# Patient Record
Sex: FEMALE | Race: WHITE | URBAN - METROPOLITAN AREA
[De-identification: names, ages, dates, MRNs, and addresses within clinical notes are randomized per-mention and may not be internally consistent; named-entity substitution may affect disease eponyms.]

---

## 2018-11-26 RX ORDER — ROSUVASTATIN CALCIUM 10 MG/1
10 TABLET, COATED ORAL
COMMUNITY
End: 2018-11-26 | Stop reason: SDUPTHER

## 2018-11-26 RX ORDER — LORAZEPAM 0.5 MG/1
0.5 TABLET ORAL DAILY
COMMUNITY
End: 2018-12-14 | Stop reason: SDUPTHER

## 2018-11-26 RX ORDER — LORAZEPAM 0.5 MG/1
0.5 TABLET ORAL DAILY
Qty: 30 TAB | OUTPATIENT
Start: 2018-11-26

## 2018-11-26 RX ORDER — ROSUVASTATIN CALCIUM 10 MG/1
10 TABLET, COATED ORAL
Qty: 90 TAB | Refills: 0 | Status: SHIPPED | OUTPATIENT
Start: 2018-11-26 | End: 2019-02-20 | Stop reason: SDUPTHER

## 2018-11-27 NOTE — TELEPHONE ENCOUNTER
Patient's medications were refilled with the exception of lorazepam. Patient must be seen for refilled on controlled substances per Dr. Elier Marsh

## 2018-11-28 NOTE — TELEPHONE ENCOUNTER
Called  patient and spoke with her . Confirmed that patients Rosuvastatin had been approved and sent to the pharmacy but patient would need an appt to refill controlled substance, Lorazepam.  Her  states she was having her hair done just now and she would call back to schedule this.

## 2018-12-06 RX ORDER — LORAZEPAM 0.5 MG/1
TABLET ORAL
Qty: 30 TAB | Refills: 0 | OUTPATIENT
Start: 2018-12-06

## 2018-12-07 NOTE — TELEPHONE ENCOUNTER
Patient must be seen for refill of controlled substances.  Medication was refused by Dr. Catherine Bender

## 2018-12-14 ENCOUNTER — OFFICE VISIT (OUTPATIENT)
Dept: FAMILY MEDICINE CLINIC | Age: 78
End: 2018-12-14

## 2018-12-14 VITALS
HEIGHT: 65 IN | WEIGHT: 126 LBS | RESPIRATION RATE: 16 BRPM | OXYGEN SATURATION: 97 % | TEMPERATURE: 98.1 F | HEART RATE: 59 BPM | SYSTOLIC BLOOD PRESSURE: 104 MMHG | BODY MASS INDEX: 20.99 KG/M2 | DIASTOLIC BLOOD PRESSURE: 65 MMHG

## 2018-12-14 DIAGNOSIS — E78.5 HYPERLIPIDEMIA, UNSPECIFIED HYPERLIPIDEMIA TYPE: ICD-10-CM

## 2018-12-14 DIAGNOSIS — F41.9 ANXIETY: ICD-10-CM

## 2018-12-14 DIAGNOSIS — Z00.00 ANNUAL PHYSICAL EXAM: Primary | ICD-10-CM

## 2018-12-14 DIAGNOSIS — R00.2 HEART PALPITATIONS: ICD-10-CM

## 2018-12-14 DIAGNOSIS — Z00.00 MEDICARE ANNUAL WELLNESS VISIT, SUBSEQUENT: ICD-10-CM

## 2018-12-14 RX ORDER — FLECAINIDE ACETATE 50 MG/1
TABLET ORAL
Refills: 3 | COMMUNITY
Start: 2018-11-07

## 2018-12-14 RX ORDER — METOPROLOL SUCCINATE 25 MG/1
TABLET, EXTENDED RELEASE ORAL
Refills: 1 | COMMUNITY
Start: 2018-09-13 | End: 2018-12-14 | Stop reason: SDUPTHER

## 2018-12-14 RX ORDER — LORAZEPAM 0.5 MG/1
0.5 TABLET ORAL
Qty: 30 TAB | Refills: 1 | Status: SHIPPED | OUTPATIENT
Start: 2018-12-14 | End: 2020-03-04 | Stop reason: SDUPTHER

## 2018-12-14 RX ORDER — METOPROLOL SUCCINATE 25 MG/1
TABLET, EXTENDED RELEASE ORAL
Qty: 90 TAB | Refills: 1 | Status: SHIPPED | OUTPATIENT
Start: 2018-12-14 | End: 2019-06-11 | Stop reason: SDUPTHER

## 2018-12-14 NOTE — PROGRESS NOTES
Assessment/Plan:     Diagnoses and all orders for this visit:    1. Annual physical exam  -     METABOLIC PANEL, BASIC  -     MICROALBUMIN, UR, RAND W/ MICROALB/CREAT RATIO  -     CBC WITH AUTOMATED DIFF  -     URINALYSIS W/ RFLX MICROSCOPIC  -     HEPATIC FUNCTION PANEL    2. Hyperlipidemia, unspecified hyperlipidemia type  -     LIPID PANEL  - Presumed stable, labs today    3. Heart palpitations  -     metoprolol succinate (TOPROL-XL) 25 mg XL tablet; TK 1 T PO every day  - Stable today, Managed by specialist    4. Anxiety  -     LORazepam (ATIVAN) 0.5 mg tablet; Take 1 Tab by mouth daily as needed for Anxiety. - Controlled, refill 30 day of Lorazepam,  checked and appropriated. Explained to patient will not refill again and she will need to find a physician to manage this medication. Understanding verbalized. 5. Medicare annual wellness visit, subsequent        Follow-up Disposition:  Return in about 6 months (around 6/14/2019) for Follow Up. Discussed expected course/resolution/complications of diagnosis in detail with patient.    Medication risks/benefits/costs/interactions/alternatives discussed with patient.    Pt was given after visit summary which includes diagnoses, current medications & vitals. Pt expressed understanding with the diagnosis and plan        Subjective:      Isidro Sharma is a 70 y.o. female who presents for had concerns including Complete Physical and Medication Refill (lorazepam / Metoprolol ER Succ). Here today for annual physical.  Has a history of low blood pressure, palpitations, and hyperlipidemia. She takes Lorazepam for anxiety when flying. Last refill was 12/2017, 1 year ago. Does not exercise much. States she eats a healthy diet. Sees a dentist every 6 months. Sees an eye doctor annually. Has had colonoscopies in the past and due back after 80. Has yearly mammograms.     Anxiety  States has had anxiety for many year and was started on Lorazepam \"years ago\"  She only takes when flying or needed and a 3 month prescription lasts her over a year. She denies depression or any suicidal thoughts. Current Outpatient Medications   Medication Sig Dispense Refill    flecainide (TAMBOCOR) 50 mg tablet TK 1 T PO Q 12 H  3    metoprolol succinate (TOPROL-XL) 25 mg XL tablet TK 1 T PO QD 90 Tab 1    LORazepam (ATIVAN) 0.5 mg tablet Take 1 Tab by mouth daily as needed for Anxiety. 30 Tab 1    rosuvastatin (CRESTOR) 10 mg tablet Take 1 Tab by mouth nightly. 90 Tab 0       No Known Allergies    ROS:   Review of Systems   Constitutional: Negative for chills, fever and malaise/fatigue. HENT: Negative for congestion and sore throat. Respiratory: Negative for cough and shortness of breath. Cardiovascular: Positive for palpitations. Negative for chest pain and leg swelling. Gastrointestinal: Negative for abdominal pain, constipation, diarrhea, nausea and vomiting. Genitourinary: Negative for dysuria, frequency, hematuria and urgency. Neurological: Negative for dizziness and headaches. Psychiatric/Behavioral: Negative for depression. The patient is nervous/anxious. The patient does not have insomnia. Objective:     Visit Vitals  /65 (BP 1 Location: Right arm, BP Patient Position: Sitting)   Pulse (!) 59   Temp 98.1 °F (36.7 °C) (Oral)   Resp 16   Ht 5' 5\" (1.651 m)   Wt 126 lb (57.2 kg)   SpO2 97%   BMI 20.97 kg/m²       Vitals and Nurse Documentation reviewed. Physical Exam   Constitutional: She is well-developed, well-nourished, and in no distress. HENT:   Head: Normocephalic and atraumatic. Right Ear: Tympanic membrane normal.   Left Ear: Tympanic membrane normal.   Nose: Nose normal.   Mouth/Throat: Oropharynx is clear and moist. Normal dentition. Eyes: EOM are normal. Pupils are equal, round, and reactive to light. Right eye exhibits no discharge. Left eye exhibits no discharge. Right conjunctiva is not injected.  Left conjunctiva is not injected. Neck: Neck supple. Carotid bruit is not present. No thyroid mass and no thyromegaly present. Cardiovascular: Normal rate, regular rhythm, S1 normal and S2 normal. Exam reveals no gallop and no friction rub. No murmur heard. Pulses:       Dorsalis pedis pulses are 2+ on the right side. Posterior tibial pulses are 2+ on the right side. Pulmonary/Chest: Breath sounds normal.   Abdominal: Normal appearance and bowel sounds are normal. She exhibits no distension and no mass. There is no hepatosplenomegaly. There is no tenderness. Musculoskeletal: Normal range of motion. Lymphadenopathy:     She has no cervical adenopathy. Neurological: She is alert. She has normal sensation and normal strength. Gait normal. Gait normal.   Skin: Skin is warm, dry and intact. No rash noted. Psychiatric: Mood and affect normal.       No results found for this or any previous visit. This is the Subsequent Medicare Annual Wellness Exam, performed 12 months or more after the Initial AWV or the last Subsequent AWV    I have reviewed the patient's medical history in detail and updated the computerized patient record.      History     Past Medical History:   Diagnosis Date    Colonic polyp     Gastroesophageal reflux disease     Hemorrhagic cystitis     Hyperlipemia     Impaired fasting glucose     5/9/2012 106     Irritable bowel syndrome (IBS)     Osteoporosis     was on Bisphosphonate over 5 years    Pigmented skin lesion     Face ( Benign)     Sudeck's atrophy     PHof  Sudec's Atrophy     Urinary tract infection     Recurrent Frequent      Past Surgical History:   Procedure Laterality Date    HX APPENDECTOMY      HX OTHER SURGICAL      Fracture  right  [place]      Current Outpatient Medications   Medication Sig Dispense Refill    flecainide (TAMBOCOR) 50 mg tablet TK 1 T PO Q 12 H  3    metoprolol succinate (TOPROL-XL) 25 mg XL tablet TK 1 T PO QD 90 Tab 1    LORazepam (ATIVAN) 0.5 mg tablet Take 1 Tab by mouth daily as needed for Anxiety. 30 Tab 1    rosuvastatin (CRESTOR) 10 mg tablet Take 1 Tab by mouth nightly. 80 Tab 0     No Known Allergies  Family History   Family history unknown: Yes     Social History     Tobacco Use    Smoking status: Never Smoker    Smokeless tobacco: Never Used   Substance Use Topics    Alcohol use: No     Frequency: Never     There is no problem list on file for this patient. Depression Risk Factor Screening:     PHQ over the last two weeks 12/14/2018   Little interest or pleasure in doing things Not at all   Feeling down, depressed, irritable, or hopeless Not at all   Total Score PHQ 2 0     Alcohol Risk Factor Screening: You do not drink alcohol or very rarely. Functional Ability and Level of Safety:   Hearing Loss  Hearing is good. Activities of Daily Living  The home contains: no safety equipment. Patient does total self care    Fall Risk  Fall Risk Assessment, last 12 mths 12/14/2018   Able to walk? Yes   Fall in past 12 months? No       Abuse Screen  Patient is not abused    Cognitive Screening   Evaluation of Cognitive Function:  Has your family/caregiver stated any concerns about your memory: no  Normal    Patient Care Team   Patient Care Team:  Se Fernandez MD as PCP - General (Family Practice)    Assessment/Plan   Education and counseling provided:  Are appropriate based on today's review and evaluation  Pneumococcal Vaccine  Influenza Vaccine  Screening Mammography    Diagnoses and all orders for this visit:    1. Annual physical exam  -     METABOLIC PANEL, BASIC  -     MICROALBUMIN, UR, RAND W/ MICROALB/CREAT RATIO  -     CBC WITH AUTOMATED DIFF  -     URINALYSIS W/ RFLX MICROSCOPIC  -     HEPATIC FUNCTION PANEL    2. Hyperlipidemia, unspecified hyperlipidemia type  -     LIPID PANEL    3. Heart palpitations  -     metoprolol succinate (TOPROL-XL) 25 mg XL tablet; TK 1 T PO QD    4.  Anxiety  -     LORazepam (ATIVAN) 0.5 mg tablet; Take 1 Tab by mouth daily as needed for Anxiety.     5. Medicare annual wellness visit, subsequent        Health Maintenance Due   Topic Date Due    DTaP/Tdap/Td series (1 - Tdap) 10/13/1963    GLAUCOMA SCREENING Q2Y  10/13/2007    Bone Densitometry (Dexa) Screening  10/13/2007    Pneumococcal 65+ Low/Medium Risk (1 of 2 - PCV13) 10/13/2007

## 2018-12-14 NOTE — PROGRESS NOTES
Severiano Sager is a 70 y.o. female      Chief Complaint   Patient presents with    Complete Physical    Medication Refill     lorazepam / Metoprolol ER Succ         1. Have you been to the ER, urgent care clinic since your last visit? Hospitalized since your last visit? No      2. Have you seen or consulted any other health care providers outside of the [address] [place] [address] since your last visit? Include any pap smears or colon screening.   No

## 2018-12-14 NOTE — PATIENT INSTRUCTIONS
Medicare Wellness Visit, Female     The best way to live healthy is to have a lifestyle where you eat a well-balanced diet, exercise regularly, limit alcohol use, and quit all forms of tobacco/nicotine, if applicable. Regular preventive services are another way to keep healthy. Preventive services (vaccines, screening tests, monitoring & exams) can help personalize your care plan, which helps you manage your own care. Screening tests can find health problems at the earliest stages, when they are easiest to treat. [facility] follows the current, evidence-based guidelines published by the [facility] ([facility]) when recommending preventive services for our patients. Because we follow these guidelines, sometimes recommendations change over time as research supports it. (For example, mammograms used to be recommended annually. Even though Medicare will still pay for an annual mammogram, the newer guidelines recommend a mammogram every two years for women of average risk.)  Of course, you and your doctor may decide to screen more often for some diseases, based on your risk and your health status. Preventive services for you include:  - Medicare offers their members a free annual wellness visit, which is time for you and your primary care provider to discuss and plan for your preventive service needs. Take advantage of this benefit every year!  -All adults over the age of 72 should receive the recommended pneumonia vaccines. Current USPSTF guidelines recommend a series of two vaccines for the best pneumonia protection.   -All adults should have a flu vaccine yearly and a tetanus vaccine every 10 years. All adults age 61 and older should receive a shingles vaccine once in their lifetime.    -A bone mass density test is recommended when a woman turns 65 to screen for osteoporosis. This test is only recommended one time, as a screening.  Some providers will use this same test as a disease monitoring tool if you already have osteoporosis. -All adults age 38-68 who are overweight should have a diabetes screening test once every three years.   -Other screening tests and preventive services for persons with diabetes include: an eye exam to screen for diabetic retinopathy, a kidney function test, a foot exam, and stricter control over your cholesterol.   -Cardiovascular screening for adults with routine risk involves an electrocardiogram (ECG) at intervals determined by your doctor.   -Colorectal cancer screenings should be done for adults age 54-65 with no increased risk factors for colorectal cancer. There are a number of acceptable methods of screening for this type of cancer. Each test has its own benefits and drawbacks. Discuss with your doctor what is most appropriate for you during your annual wellness visit. The different tests include: colonoscopy (considered the best screening method), a fecal occult blood test, a fecal DNA test, and sigmoidoscopy. -Breast cancer screenings are recommended every other year for women of normal risk, age 54-69.  -Cervical cancer screenings for women over age 72 are only recommended with certain risk factors.   -All adults born between [facility] should be screened once for Hepatitis C. Here is a list of your current Health Maintenance items (your personalized list of preventive services) with a due date:  Health Maintenance Due   Topic Date Due    DTaP/Tdap/Td  (1 - Tdap) 10/13/1963    Glaucoma Screening   10/13/2007    Bone Mineral Density   10/13/2007    Pneumococcal Vaccine (1 of 2 - PCV13) 10/13/2007            High Cholesterol: Care Instructions  Your Care Instructions    Cholesterol is a type of fat in your blood. It is needed for many body functions, such as making new cells. Cholesterol is made by your body. It also comes from food you eat. High cholesterol means that you have too much of the fat in your blood.  This raises your risk of a heart attack and stroke. LDL and HDL are part of your total cholesterol. LDL is the \"bad\" cholesterol. High LDL can raise your risk for heart disease, heart attack, and stroke. HDL is the \"good\" cholesterol. It helps clear bad cholesterol from the body. High HDL is linked with a lower risk of heart disease, heart attack, and stroke. Your cholesterol levels help your doctor find out your risk for having a heart attack or stroke. You and your doctor can talk about whether you need to lower your risk and what treatment is best for you. A heart-healthy lifestyle along with medicines can help lower your cholesterol and your risk. The way you choose to lower your risk will depend on how high your risk is for heart attack and stroke. It will also depend on how you feel about taking medicines. Follow-up care is a key part of your treatment and safety. Be sure to make and go to all appointments, and call your doctor if you are having problems. It's also a good idea to know your test results and keep a list of the medicines you take. How can you care for yourself at home? · Eat a variety of foods every day. Good choices include fruits, vegetables, whole grains (like oatmeal), dried beans and peas, nuts and seeds, soy products (like tofu), and fat-free or low-fat dairy products. · Replace butter, margarine, and hydrogenated or partially hydrogenated oils with olive and canola oils. (Canola oil margarine without trans fat is fine.)  · Replace red meat with fish, poultry, and soy protein (like tofu). · Limit processed and packaged foods like chips, crackers, and cookies. · Bake, broil, or steam foods. Don't siddiqui them. · Be physically active. Get at least 30 minutes of exercise on most days of the week. Walking is a good choice. You also may want to do other activities, such as running, swimming, cycling, or playing tennis or team sports.   · Stay at a healthy weight or lose weight by making the changes in eating and physical activity listed above. Losing just a small amount of weight, even 5 to 10 pounds, can reduce your risk for having a heart attack or stroke. · Do not smoke. When should you call for help? Watch closely for changes in your health, and be sure to contact your doctor if:    · You need help making lifestyle changes.     · You have questions about your medicine. Where can you learn more? Go to http://seferino-cornell.info/. Enter N428 in the search box to learn more about \"High Cholesterol: Care Instructions. \"  Current as of: December 6, 2017  Content Version: 11.8  © 9239-4135 [employer]. Care instructions adapted under license by [employer] (which disclaims liability or warranty for this information). If you have questions about a medical condition or this instruction, always ask your healthcare professional. [address] any warranty or liability for your use of this information.

## 2018-12-15 LAB
ALBUMIN SERPL-MCNC: 4.5 G/DL (ref 3.5–4.8)
ALBUMIN/CREAT UR: 34 MG/G CREAT (ref 0–30)
ALP SERPL-CCNC: 58 IU/L (ref 39–117)
ALT SERPL-CCNC: 29 IU/L (ref 0–32)
APPEARANCE UR: CLEAR
AST SERPL-CCNC: 34 IU/L (ref 0–40)
BASOPHILS # BLD AUTO: 0 X10E3/UL (ref 0–0.2)
BASOPHILS NFR BLD AUTO: 0 %
BILIRUB DIRECT SERPL-MCNC: 0.2 MG/DL (ref 0–0.4)
BILIRUB SERPL-MCNC: 0.7 MG/DL (ref 0–1.2)
BILIRUB UR QL STRIP: NEGATIVE
BUN SERPL-MCNC: 16 MG/DL (ref 8–27)
BUN/CREAT SERPL: 24 (ref 12–28)
CALCIUM SERPL-MCNC: 9.4 MG/DL (ref 8.7–10.3)
CHLORIDE SERPL-SCNC: 103 MMOL/L (ref 96–106)
CHOLEST SERPL-MCNC: 167 MG/DL (ref 100–199)
CO2 SERPL-SCNC: 28 MMOL/L (ref 20–29)
COLOR UR: YELLOW
CREAT SERPL-MCNC: 0.66 MG/DL (ref 0.57–1)
CREAT UR-MCNC: 67.9 MG/DL
EOSINOPHIL # BLD AUTO: 0 X10E3/UL (ref 0–0.4)
EOSINOPHIL NFR BLD AUTO: 0 %
ERYTHROCYTE [DISTWIDTH] IN BLOOD BY AUTOMATED COUNT: 12.8 % (ref 12.3–15.4)
GLUCOSE SERPL-MCNC: 98 MG/DL (ref 65–99)
GLUCOSE UR QL: NEGATIVE
HCT VFR BLD AUTO: 44 % (ref 34–46.6)
HDLC SERPL-MCNC: 76 MG/DL
HGB BLD-MCNC: 14.1 G/DL (ref 11.1–15.9)
HGB UR QL STRIP: NEGATIVE
IMM GRANULOCYTES # BLD: 0 X10E3/UL (ref 0–0.1)
IMM GRANULOCYTES NFR BLD: 0 %
KETONES UR QL STRIP: NEGATIVE
LDLC SERPL CALC-MCNC: 76 MG/DL (ref 0–99)
LEUKOCYTE ESTERASE UR QL STRIP: NEGATIVE
LYMPHOCYTES # BLD AUTO: 1.9 X10E3/UL (ref 0.7–3.1)
LYMPHOCYTES NFR BLD AUTO: 26 %
MCH RBC QN AUTO: 30.7 PG (ref 26.6–33)
MCHC RBC AUTO-ENTMCNC: 32 G/DL (ref 31.5–35.7)
MCV RBC AUTO: 96 FL (ref 79–97)
MICRO URNS: NORMAL
MICROALBUMIN UR-MCNC: 23.1 UG/ML
MONOCYTES # BLD AUTO: 0.4 X10E3/UL (ref 0.1–0.9)
MONOCYTES NFR BLD AUTO: 6 %
NEUTROPHILS # BLD AUTO: 4.8 X10E3/UL (ref 1.4–7)
NEUTROPHILS NFR BLD AUTO: 68 %
NITRITE UR QL STRIP: NEGATIVE
PH UR STRIP: 5.5 [PH] (ref 5–7.5)
PLATELET # BLD AUTO: 176 X10E3/UL (ref 150–379)
POTASSIUM SERPL-SCNC: 3.9 MMOL/L (ref 3.5–5.2)
PROT SERPL-MCNC: 6.7 G/DL (ref 6–8.5)
PROT UR QL STRIP: NEGATIVE
RBC # BLD AUTO: 4.59 X10E6/UL (ref 3.77–5.28)
SODIUM SERPL-SCNC: 143 MMOL/L (ref 134–144)
SP GR UR: 1.01 (ref 1–1.03)
TRIGL SERPL-MCNC: 74 MG/DL (ref 0–149)
UROBILINOGEN UR STRIP-MCNC: 0.2 MG/DL (ref 0.2–1)
VLDLC SERPL CALC-MCNC: 15 MG/DL (ref 5–40)
WBC # BLD AUTO: 7.1 X10E3/UL (ref 3.4–10.8)

## 2019-06-05 ENCOUNTER — OFFICE VISIT (OUTPATIENT)
Dept: FAMILY MEDICINE CLINIC | Age: 79
End: 2019-06-05

## 2019-06-05 VITALS
TEMPERATURE: 98.3 F | BODY MASS INDEX: 20.99 KG/M2 | WEIGHT: 126 LBS | DIASTOLIC BLOOD PRESSURE: 71 MMHG | OXYGEN SATURATION: 98 % | SYSTOLIC BLOOD PRESSURE: 115 MMHG | RESPIRATION RATE: 14 BRPM | HEART RATE: 63 BPM | HEIGHT: 65 IN

## 2019-06-05 DIAGNOSIS — M18.0 PRIMARY OSTEOARTHRITIS OF BOTH FIRST CARPOMETACARPAL JOINTS: Primary | ICD-10-CM

## 2019-06-05 DIAGNOSIS — M19.041 PRIMARY OSTEOARTHRITIS OF BOTH HANDS: ICD-10-CM

## 2019-06-05 DIAGNOSIS — M19.042 PRIMARY OSTEOARTHRITIS OF BOTH HANDS: ICD-10-CM

## 2019-06-05 RX ORDER — ACETAMINOPHEN 325 MG/1
TABLET ORAL
COMMUNITY

## 2019-06-05 RX ORDER — ASCORBIC ACID 500 MG
500 TABLET ORAL DAILY
COMMUNITY

## 2019-06-05 RX ORDER — DICLOFENAC SODIUM 10 MG/G
GEL TOPICAL 4 TIMES DAILY
Qty: 100 G | Refills: 2 | Status: SHIPPED | OUTPATIENT
Start: 2019-06-05

## 2019-06-05 RX ORDER — CEPHALEXIN 250 MG/1
250 CAPSULE ORAL 4 TIMES DAILY
COMMUNITY
End: 2019-08-07

## 2019-06-05 RX ORDER — POTASSIUM &MAGNESIUM ASPARTATE 250-250 MG
500 CAPSULE ORAL DAILY
COMMUNITY

## 2019-06-05 RX ORDER — MULTIVITAMIN
1 TABLET ORAL DAILY
COMMUNITY

## 2019-06-05 NOTE — PROGRESS NOTES
Assessment and Plan    1. Primary osteoarthritis of both first carpometacarpal joints  Get CMC thumb splint to wear PRN  May return for thumb injections PRN  Heat, motion. 2. Primary osteoarthritis of both hands  djd diagnosis discussed, may use hand cream  - diclofenac (VOLTAREN) 1 % gel; Apply  to affected area four (4) times daily. Dispense: 100 g; Refill: 2      Follow-up and Dispositions    · Return if symptoms worsen or fail to improve. Diagnosis and plan discussed with patient who verbillized understanding. History of present illness:Siena Webber is a 70 y.o. female presenting for Hand Pain (Bilateral )    Bilateral hand pain. Mainly thumbs, little fingers and index fingers bilat  Hurts at base of thumbs  No injury   notes ulnar styloid and big veins (neither hurt)    Review of Systems   Musculoskeletal: Positive for joint pain. Negative for falls and neck pain.          Past Medical History:   Diagnosis Date    Colonic polyp     Gastroesophageal reflux disease     Hemorrhagic cystitis     Hyperlipemia     Impaired fasting glucose     5/9/2012 106     Irritable bowel syndrome (IBS)     Osteoporosis     was on Bisphosphonate over 5 years    Pigmented skin lesion     Face ( Benign)     Sudeck's atrophy     PHof  Sudec's Atrophy     Urinary tract infection     Recurrent Frequent     Past Surgical History:   Procedure Laterality Date    HX APPENDECTOMY      HX OTHER SURGICAL      Fracture  right  [place]      Family History   Family history unknown: Yes     Social History     Socioeconomic History    Marital status:      Spouse name: Not on file    Number of children: Not on file    Years of education: Not on file    Highest education level: Not on file   Occupational History    Not on file   Social Needs    Financial resource strain: Not on file    Food insecurity:     Worry: Not on file     Inability: Not on file    Transportation needs:     Medical: Not on file     Non-medical: Not on file   Tobacco Use    Smoking status: Never Smoker    Smokeless tobacco: Never Used   Substance and Sexual Activity    Alcohol use: No     Frequency: Never    Drug use: No    Sexual activity: Yes   Lifestyle    Physical activity:     Days per week: Not on file     Minutes per session: Not on file    Stress: Not on file   Relationships    Social connections:     Talks on phone: Not on file     Gets together: Not on file     Attends Sikhism service: Not on file     Active member of club or organization: Not on file     Attends meetings of clubs or organizations: Not on file     Relationship status: Not on file    Intimate partner violence:     Fear of current or ex partner: Not on file     Emotionally abused: Not on file     Physically abused: Not on file     Forced sexual activity: Not on file   Other Topics Concern    Not on file   Social History Narrative    Not on file         Current Outpatient Medications   Medication Sig Dispense Refill    cephALEXin (KEFLEX) 250 mg capsule Take 250 mg by mouth four (4) times daily. Indications: as needed for UTI      calcium-cholecalciferol, D3, (CALTRATE 600+D) tablet Take 1 Tab by mouth daily.  ascorbic acid, vitamin C, (VITAMIN C) 500 mg tablet Take  by mouth.  cranberry 500 mg capsule Take 500 mg by mouth daily.  acetaminophen (TYLENOL) 325 mg tablet Take  by mouth every four (4) hours as needed for Pain.  diclofenac (VOLTAREN) 1 % gel Apply  to affected area four (4) times daily. 100 g 2    rosuvastatin (CRESTOR) 10 mg tablet TAKE 1 TABLET BY MOUTH EVERY NIGHT 90 Tab 1    flecainide (TAMBOCOR) 50 mg tablet TK 1 T PO Q 12 H  3    metoprolol succinate (TOPROL-XL) 25 mg XL tablet TK 1 T PO QD 90 Tab 1    LORazepam (ATIVAN) 0.5 mg tablet Take 1 Tab by mouth daily as needed for Anxiety.  30 Tab 1         No Known Allergies    Vitals:    06/05/19 1052   BP: 115/71   Pulse: 63   Resp: 14   Temp: 98.3 °F (36.8 °C) TempSrc: Oral   SpO2: 98%   Weight: 126 lb (57.2 kg)   Height: 5' 5\" (1.651 m)     Body mass index is 20.97 kg/m². Objective  General: Patient alert and oriented and in NAD  Hands:  bilat large Thumb CMC joints tender R>L, tender DIP and PIP left little finger. Otherwise normal joints and ROM bilat  Psych: Appropriate mood and affect, no homicidal or suicidal ideation, no obsessions, delusions or hallucinations, normal psychomotor status.

## 2019-06-05 NOTE — PROGRESS NOTES
Rocío Real is a 70 y.o. female      Chief Complaint   Patient presents with    Hand Pain     Bilateral          1. Have you been to the ER, urgent care clinic since your last visit? Hospitalized since your last visit? no      2. Have you seen or consulted any other health care providers outside of the [address] [place] [address] since your last visit? Include any pap smears or colon screening.   Yes 5/23/19 Mammogram

## 2019-06-05 NOTE — PATIENT INSTRUCTIONS
Get Aia 16 splin     Thumb Arthritis: Exercises  Your Care Instructions  Here are some examples of exercises for thumb arthritis. Start each exercise slowly. Ease off the exercise if you start to have pain. Your doctor or your physical or occupational therapist will tell you when you can start these exercises and which ones will work best for you. How to do the exercises  Thumb IP flexion    1. Place your forearm and hand on a table with your affected thumb pointing up. 2. With your other hand, hold your thumb steady just below the joint nearest your thumbnail. 3. Bend the tip of your thumb downward, then straighten it. 4. Repeat 8 to 12 times. 5. Switch hands and repeat steps 1 through 4, even if only one thumb is sore. Thumb MP flexion    1. Place your forearm and hand on a table with your affected thumb pointing up. 2. With your other hand, hold the base of your thumb and palm steady. 3. Bend your thumb downward where it meets your palm, then straighten it. 4. Repeat 8 to 12 times. 5. Switch hands and repeat steps 1 through 4, even if only one thumb is sore. Thumb opposition    1. With your affected hand, point your fingers and thumb straight up. Your wrist should be relaxed, following the line of your fingers and thumb. 2. Touch your affected thumb to each finger, one finger at a time. This will look like an \"okay\" sign, but try to keep your other fingers straight and pointing upward as much as you can. 3. Repeat 8 to 12 times. 4. Switch hands and repeat steps 1 through 3, even if only one thumb is sore. Follow-up care is a key part of your treatment and safety. Be sure to make and go to all appointments, and call your doctor if you are having problems. It's also a good idea to know your test results and keep a list of the medicines you take. Where can you learn more? Go to http://seferino-cornell.info/.   Enter Q955 in the search box to learn more about \"Thumb Arthritis: Exercises. \"  Current as of: September 20, 2018  Content Version: 11.9  © 7100-7455 [employer]. Care instructions adapted under license by [employer] (which disclaims liability or warranty for this information). If you have questions about a medical condition or this instruction, always ask your healthcare professional. [address] any warranty or liability for your use of this information.     GET CMC thumb splint

## 2019-06-11 DIAGNOSIS — R00.2 HEART PALPITATIONS: ICD-10-CM

## 2019-06-13 RX ORDER — METOPROLOL SUCCINATE 25 MG/1
TABLET, EXTENDED RELEASE ORAL
Qty: 90 TAB | Refills: 0 | Status: SHIPPED | OUTPATIENT
Start: 2019-06-13

## 2019-06-14 NOTE — TELEPHONE ENCOUNTER
LVM informing patient that medication has been refilled but patient is due for an appt. Asked her to call back to schedule this.

## 2019-08-07 ENCOUNTER — OFFICE VISIT (OUTPATIENT)
Dept: FAMILY MEDICINE CLINIC | Age: 79
End: 2019-08-07

## 2019-08-07 VITALS
RESPIRATION RATE: 16 BRPM | SYSTOLIC BLOOD PRESSURE: 109 MMHG | OXYGEN SATURATION: 96 % | HEART RATE: 60 BPM | DIASTOLIC BLOOD PRESSURE: 70 MMHG | WEIGHT: 126 LBS | HEIGHT: 65 IN | BODY MASS INDEX: 20.99 KG/M2 | TEMPERATURE: 97.8 F

## 2019-08-07 DIAGNOSIS — R07.81 RIB PAIN ON LEFT SIDE: ICD-10-CM

## 2019-08-07 DIAGNOSIS — M94.9 DISORDER OF BONE AND CARTILAGE: ICD-10-CM

## 2019-08-07 DIAGNOSIS — M89.9 DISORDER OF BONE AND CARTILAGE: ICD-10-CM

## 2019-08-07 DIAGNOSIS — Z23 ENCOUNTER FOR IMMUNIZATION: Primary | ICD-10-CM

## 2019-08-07 NOTE — PROGRESS NOTES
Identified pt with two pt identifiers(name and ). Reviewed record in preparation for visit and have obtained necessary documentation. Chief Complaint   Patient presents with   [facility] Follow Up     19: [facility] ED for bruised ribs, left side; CT showed no fracture        Health Maintenance Due   Topic    DTaP/Tdap/Td series (1 - Tdap)    GLAUCOMA SCREENING Q2Y     Bone Densitometry (Dexa) Screening     Pneumococcal 65+ years (2 of 2 - PPSV23)    Influenza Age 5 to Adult        Coordination of Care Questionnaire:  :   1) Have you been to an emergency room, urgent care, or hospitalized since your last visit? If yes, where when, and reason for visit? yes - see today's reason for visit      2. Have seen or consulted any other health care provider since your last visit? If yes, where when, and reason for visit? NO      Patient is accompanied by self I have received verbal consent from Gunner Marquis to discuss any/all medical information while they are present in the room.

## 2019-08-07 NOTE — PROGRESS NOTES
Luke Alcantar is a 70 y.o. female who presents today with the following:    HPI  Chief Complaint   Patient presents with   [facility] Follow Up     7/27/19: [facility] ED for bruised ribs, left side; CT showed no fracture       1. Encounter for immunization    2. Rib pain on left side    3. Disorder of bone and cartilage    Patient reports having allergic reaction to the flu shot twice but the third time she had the [facility] and she did not develop any reaction. She reports she will [facility] to get the flu shot this year. She agrees to get tetanus immunization today. In regards to her Left-sided rib pain, patient went to [facility] ER on 7/26/2019. A CT scan was done to rule out rib fracture. The CT scan was normal.  Hospital discharge summary was reviewed by me today. Patient still reports having pain on the left side but does not want any other pain medication besides Tylenol at this time. Osteoporosis: Patient has a history of osteoporosis and has been on bisphosphonates in the past.  I will reevaluate with a bone density scan today. Review of Systems   Constitutional: Negative. HENT: Negative. Eyes: Negative. Respiratory: Negative. Cardiovascular: Negative. Gastrointestinal: Negative. Genitourinary: Negative. Musculoskeletal: Negative. Rib pain   Skin: Negative. Neurological: Negative. Endo/Heme/Allergies: Negative. Psychiatric/Behavioral: Negative. Physical Exam   Constitutional: She is oriented to person, place, and time and well-developed, well-nourished, and in no distress. HENT:   Head: Normocephalic and atraumatic. Right Ear: External ear normal.   Left Ear: External ear normal.   Nose: Nose normal.   Mouth/Throat: No oropharyngeal exudate. Eyes: Conjunctivae are normal.   Neck: Normal range of motion. Neck supple. No thyromegaly present. Cardiovascular: Normal rate and regular rhythm. Pulmonary/Chest: Effort normal and breath sounds normal.   Abdominal: Soft. Bowel sounds are normal. She exhibits no distension. There is no tenderness. Musculoskeletal: Normal range of motion. She exhibits no edema. Lymphadenopathy:     She has no cervical adenopathy. Neurological: She is alert and oriented to person, place, and time. Skin: Skin is warm and dry. Psychiatric: Mood and affect normal.   Nursing note and vitals reviewed. /70   Pulse 60   Temp 97.8 °F (36.6 °C) (Oral)   Resp 16   Ht 5' 5\" (1.651 m)   Wt 126 lb (57.2 kg)   SpO2 96%   BMI 20.97 kg/m²     Allergies   Allergen Reactions    Flu Vac 2015 (65 Up)-Mf59c(Pf) Rash    Sulfa (Sulfonamide Antibiotics) Rash       Current Outpatient Medications   Medication Sig    diphtheria-pertussis, acellular,-tetanus (ACELL) 2.5-8-5 Lf-mcg-Lf/0.5mL susp susp 0.5 mL by IntraMUSCular route once for 1 dose.  metoprolol succinate (TOPROL-XL) 25 mg XL tablet TAKE 1 TABLET BY MOUTH EVERY DAY    calcium-cholecalciferol, D3, (CALTRATE 600+D) tablet Take 1 Tab by mouth daily.  ascorbic acid, vitamin C, (VITAMIN C) 500 mg tablet Take 500 mg by mouth daily.  cranberry 500 mg capsule Take 500 mg by mouth daily.  acetaminophen (TYLENOL) 325 mg tablet Take  by mouth every four (4) hours as needed for Pain.  rosuvastatin (CRESTOR) 10 mg tablet TAKE 1 TABLET BY MOUTH EVERY NIGHT    flecainide (TAMBOCOR) 50 mg tablet TK 1 T PO Q 12 H    LORazepam (ATIVAN) 0.5 mg tablet Take 1 Tab by mouth daily as needed for Anxiety.  diclofenac (VOLTAREN) 1 % gel Apply  to affected area four (4) times daily. No current facility-administered medications for this visit.         Past Medical History:   Diagnosis Date    Colonic polyp     Gastroesophageal reflux disease     Hemorrhagic cystitis     Hyperlipemia     Impaired fasting glucose     5/9/2012 106     Irritable bowel syndrome (IBS)     Osteoporosis     was on Bisphosphonate over 5 years    Pigmented skin lesion     Face ( Benign)     Sudeck's atrophy     PHof  Sudec's Atrophy     Urinary tract infection     Recurrent Frequent       Past Surgical History:   Procedure Laterality Date    HX APPENDECTOMY      HX OTHER SURGICAL      Fracture  right  [place]        Problem List  Date Reviewed: 8/7/2019    None           No results found for this visit on 08/07/19. 1. Encounter for immunization  Prescription for tetanus shot given. Patient reports having allergic reaction twice with the flu shot.    - diphtheria-pertussis, acellular,-tetanus (ACELL) 2.5-8-5 Lf-mcg-Lf/0.5mL susp susp; 0.5 mL by IntraMUSCular route once for 1 dose. Dispense: 0.5 mL; Refill: 0    2. Rib pain on left side  Stable. Patient continues to take Tylenol for pain. She declines any other pain medications. 3. Disorder of bone and cartilage  Patient has previous diagnosis of osteoporosis. She was on bisphosphonates for 5 years in the past.  I will recheck a bone density scan. - DEXA BONE DENSITY STUDY AXIAL; Future        Follow-up and Dispositions    · Return in about 6 months (around 2/7/2020) for follow up. I have discussed the diagnosis with the patient and the intended plan as seen in the above orders. The patient has received an after-visit summary and questions were answered concerning future plans. I have discussed medication side effects and warnings with the patient as well. The patient agrees and understands above plan.            Buster Malhotra MD

## 2020-02-24 RX ORDER — ROSUVASTATIN CALCIUM 10 MG/1
TABLET, COATED ORAL
Qty: 90 TAB | Refills: 1 | Status: SHIPPED | OUTPATIENT
Start: 2020-02-24 | End: 2020-08-27 | Stop reason: SDUPTHER

## 2020-03-04 ENCOUNTER — OFFICE VISIT (OUTPATIENT)
Dept: FAMILY MEDICINE CLINIC | Age: 80
End: 2020-03-04

## 2020-03-04 ENCOUNTER — HOSPITAL ENCOUNTER (OUTPATIENT)
Dept: LAB | Age: 80
Discharge: HOME | End: 2020-03-04

## 2020-03-04 VITALS
RESPIRATION RATE: 14 BRPM | HEIGHT: 65 IN | BODY MASS INDEX: 20.59 KG/M2 | HEART RATE: 63 BPM | DIASTOLIC BLOOD PRESSURE: 77 MMHG | TEMPERATURE: 98 F | OXYGEN SATURATION: 99 % | WEIGHT: 123.6 LBS | SYSTOLIC BLOOD PRESSURE: 126 MMHG

## 2020-03-04 DIAGNOSIS — M81.8 OTHER OSTEOPOROSIS WITHOUT CURRENT PATHOLOGICAL FRACTURE: ICD-10-CM

## 2020-03-04 DIAGNOSIS — T88.7XXA SIDE EFFECT OF MEDICATION: ICD-10-CM

## 2020-03-04 DIAGNOSIS — E78.5 HYPERLIPIDEMIA, UNSPECIFIED HYPERLIPIDEMIA TYPE: ICD-10-CM

## 2020-03-04 DIAGNOSIS — Z00.00 MEDICARE ANNUAL WELLNESS VISIT, SUBSEQUENT: Primary | ICD-10-CM

## 2020-03-04 DIAGNOSIS — F41.9 ANXIETY: ICD-10-CM

## 2020-03-04 LAB
25(OH)D3 SERPL-MCNC: 35.1 NG/ML (ref 30–100)
ALBUMIN SERPL-MCNC: 4 G/DL (ref 3.5–5)
ALBUMIN/GLOB SERPL: 1.3 {RATIO} (ref 1.1–2.2)
ALP SERPL-CCNC: 63 U/L (ref 45–117)
ALT SERPL-CCNC: 44 U/L (ref 12–78)
ANION GAP SERPL CALC-SCNC: 6 MMOL/L (ref 5–15)
AST SERPL-CCNC: 31 U/L (ref 15–37)
BILIRUB DIRECT SERPL-MCNC: 0.2 MG/DL (ref 0–0.2)
BILIRUB SERPL-MCNC: 0.6 MG/DL (ref 0.2–1)
BUN SERPL-MCNC: 14 MG/DL (ref 6–20)
BUN/CREAT SERPL: 20 (ref 12–20)
CALCIUM SERPL-MCNC: 9.8 MG/DL (ref 8.5–10.1)
CHLORIDE SERPL-SCNC: 108 MMOL/L (ref 97–108)
CHOLEST SERPL-MCNC: 174 MG/DL
CO2 SERPL-SCNC: 28 MMOL/L (ref 21–32)
CREAT SERPL-MCNC: 0.7 MG/DL (ref 0.55–1.02)
GLOBULIN SER CALC-MCNC: 3 G/DL (ref 2–4)
GLUCOSE SERPL-MCNC: 93 MG/DL (ref 65–100)
HDLC SERPL-MCNC: 84 MG/DL
HDLC SERPL: 2.1 {RATIO} (ref 0–5)
LDLC SERPL CALC-MCNC: 74.4 MG/DL (ref 0–100)
LIPID PROFILE,FLP: NORMAL
POTASSIUM SERPL-SCNC: 4 MMOL/L (ref 3.5–5.1)
PROT SERPL-MCNC: 7 G/DL (ref 6.4–8.2)
SODIUM SERPL-SCNC: 142 MMOL/L (ref 136–145)
TRIGL SERPL-MCNC: 78 MG/DL (ref ?–150)
VLDLC SERPL CALC-MCNC: 15.6 MG/DL

## 2020-03-04 RX ORDER — LORAZEPAM 0.5 MG/1
0.5 TABLET ORAL
Qty: 30 TAB | Refills: 1 | Status: SHIPPED | OUTPATIENT
Start: 2020-03-04 | End: 2020-09-08 | Stop reason: SDUPTHER

## 2020-03-04 NOTE — PATIENT INSTRUCTIONS
Medicare Wellness Visit, Female The best way to live healthy is to have a lifestyle where you eat a well-balanced diet, exercise regularly, limit alcohol use, and quit all forms of tobacco/nicotine, if applicable. Regular preventive services are another way to keep healthy. Preventive services (vaccines, screening tests, monitoring & exams) can help personalize your care plan, which helps you manage your own care. Screening tests can find health problems at the earliest stages, when they are easiest to treat. [facility] follows the current, evidence-based guidelines published by the [employer] Alexandra ([facility]) when recommending preventive services for our patients. Because we follow these guidelines, sometimes recommendations change over time as research supports it. (For example, mammograms used to be recommended annually. Even though Medicare will still pay for an annual mammogram, the newer guidelines recommend a mammogram every two years for women of average risk). Of course, you and your doctor may decide to screen more often for some diseases, based on your risk and your co-morbidities (chronic disease you are already diagnosed with). Preventive services for you include: - Medicare offers their members a free annual wellness visit, which is time for you and your primary care provider to discuss and plan for your preventive service needs. Take advantage of this benefit every year! 
-All adults over the age of 72 should receive the recommended pneumonia vaccines. Current USPSTF guidelines recommend a series of two vaccines for the best pneumonia protection.  
-All adults should have a flu vaccine yearly and a tetanus vaccine every 10 years.  
-All adults age 48 and older should receive the shingles vaccines (series of two vaccines). -All adults age 38-68 who are overweight should have a diabetes screening test once every three years. -All adults born between [birth year] and 1965 should be screened once for Hepatitis C. 
-Other screening tests and preventive services for persons with diabetes include: an eye exam to screen for diabetic retinopathy, a kidney function test, a foot exam, and stricter control over your cholesterol.  
-Cardiovascular screening for adults with routine risk involves an electrocardiogram (ECG) at intervals determined by your doctor.  
-Colorectal cancer screenings should be done for adults age 54-65 with no increased risk factors for colorectal cancer. There are a number of acceptable methods of screening for this type of cancer. Each test has its own benefits and drawbacks. Discuss with your doctor what is most appropriate for you during your annual wellness visit. The different tests include: colonoscopy (considered the best screening method), a fecal occult blood test, a fecal DNA test, and sigmoidoscopy. 
 
-A bone mass density test is recommended when a woman turns 65 to screen for osteoporosis. This test is only recommended one time, as a screening. Some providers will use this same test as a disease monitoring tool if you already have osteoporosis. -Breast cancer screenings are recommended every other year for women of normal risk, age 54-69. 
-Cervical cancer screenings for women over age 72 are only recommended with certain risk factors. Here is a list of your current Health Maintenance items (your personalized list of preventive services) with a due date: 
Health Maintenance Due Topic Date Due  
 DTaP/Tdap/Td  (1 - Tdap) 10/13/1953  Shingles Vaccine (1 of 2) 10/13/1992  Glaucoma Screening   10/13/2007  Bone Mineral Density   10/13/2007  Pneumococcal Vaccine (2 of 2 - PPSV23) 07/26/2017  Cholesterol Test   12/14/2019

## 2020-03-04 NOTE — PROGRESS NOTES
This is the Subsequent Medicare Annual Wellness Exam, performed 12 months or more after the Initial AWV or the last Subsequent AWV    I have reviewed the patient's medical history in detail and updated the computerized patient record. History   There is no problem list on file for this patient. Past Medical History:   Diagnosis Date    Colonic polyp     Gastroesophageal reflux disease     Hemorrhagic cystitis     Hyperlipemia     Impaired fasting glucose     5/9/2012 106     Irritable bowel syndrome (IBS)     Osteoporosis     was on Bisphosphonate over 5 years    Pigmented skin lesion     Face ( Benign)     Sudeck's atrophy     PHof  Sudec's Atrophy     Urinary tract infection     Recurrent Frequent      Past Surgical History:   Procedure Laterality Date    HX APPENDECTOMY      HX COLONOSCOPY      age 69, repeat 8 years, Dr. Sharon Payne.  HX OTHER SURGICAL      Fracture  right  [place]      Current Outpatient Medications   Medication Sig Dispense Refill    pneumococcal 23-rola ps vaccine (PNEUMOVAX 23) 25 mcg/0.5 mL injection 0.5 mL by IntraMUSCular route once for 1 dose. 0.5 mL 0    varicella-zoster recombinant, PF, (SHINGRIX, PF,) 50 mcg/0.5 mL susr injection 0.5mL by IntraMUSCular route once now and then repeat in 2-6 months 0.5 mL 1    diph,pertuss,acel,,tetanus vac,PF, (ADACEL) 2 Lf-(2.5-5-3-5 mcg)-5Lf/0.5 mL syrg vaccine 0.5 mL by IntraMUSCular route once for 1 dose. 0.5 mL 0    LORazepam (ATIVAN) 0.5 mg tablet Take 1 Tab by mouth daily as needed for Anxiety. 30 Tab 1    rosuvastatin (CRESTOR) 10 mg tablet TAKE 1 TABLET BY MOUTH EVERY NIGHT 90 Tab 1    metoprolol succinate (TOPROL-XL) 25 mg XL tablet TAKE 1 TABLET BY MOUTH EVERY DAY 90 Tab 0    calcium-cholecalciferol, D3, (CALTRATE 600+D) tablet Take 1 Tab by mouth daily.  ascorbic acid, vitamin C, (VITAMIN C) 500 mg tablet Take 500 mg by mouth daily.  cranberry 500 mg capsule Take 500 mg by mouth daily.       acetaminophen (TYLENOL) 325 mg tablet Take  by mouth every four (4) hours as needed for Pain.  diclofenac (VOLTAREN) 1 % gel Apply  to affected area four (4) times daily. 100 g 2    flecainide (TAMBOCOR) 50 mg tablet TK 1 T PO Q 12 H  3     Allergies   Allergen Reactions    Flu Vac 2015 (65 Up)-Mf59c(Pf) Rash    Sulfa (Sulfonamide Antibiotics) Rash       Family History   Family history unknown: Yes     Social History     Tobacco Use    Smoking status: Never Smoker    Smokeless tobacco: Never Used   Substance Use Topics    Alcohol use: No     Frequency: Never       Depression Risk Factor Screening:     3 most recent PHQ Screens 3/4/2020   Little interest or pleasure in doing things Not at all   Feeling down, depressed, irritable, or hopeless Not at all   Total Score PHQ 2 0       Alcohol Risk Factor Screening:   Do you average 1 drink per night or more than 7 drinks a week:  No    On any one occasion in the past three months have you have had more than 3 drinks containing alcohol:  No      Functional Ability and Level of Safety:   Hearing: Hearing is good. Activities of Daily Living: The home contains: grab bars  Patient does total self care    Ambulation: with no difficulty    Fall Risk:  Fall Risk Assessment, last 12 mths 3/4/2020   Able to walk? Yes   Fall in past 12 months?  No       Abuse Screen:  Patient is not abused    Cognitive Screening   Has your family/caregiver stated any concerns about your memory: no  Cognitive Screening: interview    Patient Care Team   Patient Care Team:  Blanca Bustamante MD as PCP - General (Family Practice)  Blanca Bustamante MD as PCP - REHABILITATION [facility][1] Empaneled Provider    Assessment/Plan   Education and counseling provided:  Are appropriate based on today's review and evaluation  End-of-Life planning (with patient's consent)  Pneumococcal Vaccine  Influenza Vaccine  Screening Mammography  Screening Pap and pelvic (covered once every 2 years)  Colorectal cancer screening tests  Bone mass measurement (DEXA)  Screening for glaucoma     Still sees Dr. Alicia Khan    Hypercholesterolemia on statin  Palpitations     Previous osteoporosis treated with Bisphosphonate X 5 years. Anxiety on PRN lorazepam.    ROS:  General/Constitutional:  No headache, fever, fatigue, weight loss or weight gain     Eyes:  No redness, pruritis, pain, visual changes, swelling, or discharge    Ears:  No pain, loss or changes in hearin    Neck:  No swelling, masses, stiffness, pain, or limited movemen    Cardiac:   No chest pain, racing heartbeat or palpitations  Respiratory:  No cough or shortness of breath    GI:  No nausea/vomiting, diarrhea, abdominal pain, bloody or dark stools     :  No dysuria or  hematuria   Musculoskeletal:  No joint pain, muscle pain, back pain, neck pain. No joint swelling or redness. Neurological:  No loss of consciousness, dizziness, seizures, dysarthria, cognitive changes, memory changes,  problems with balance, or unilateral weakness    Skin: No rash     Physical Examination: General appearance - alert, well appearing, and in no distress, oriented to person, place, and time and normal appearing weight  Mental status -  normal mood, behavior, speech, dress, motor activity, and thought processes, no expressed suicidal or homicidal ideation, no hallucinations  Ears - bilateral TM's and external ear canals normal  Nose - normal and patent, no erythema, discharge or polyps  Mouth - mucous membranes moist, pharynx normal without lesions  Neck - supple, no significant adenopathy  Breast-sees GYN  Chest - normal chest excursion, normal inspiratory and expiratory function. Clear to ausculation bilaterally.     Heart - normal rate, regular rhythm, normal S1, S2, no murmurs, rubs, clicks or gallops, no extremity edema  Abdomen- benign, no organomegaly or masses  GYN-sees GYN  Skin-no rashes or suspicious moles  Neuro normal speech, moves all extremities, normal gait  Musculoskeletal- grossly normal joint and motor function. Diagnoses and all orders for this visit:    1. Medicare annual wellness visit, subsequent  -     pneumococcal 23-rola ps vaccine (PNEUMOVAX 23) 25 mcg/0.5 mL injection; 0.5 mL by IntraMUSCular route once for 1 dose.  -     varicella-zoster recombinant, PF, (SHINGRIX, PF,) 50 mcg/0.5 mL susr injection; 0.5mL by IntraMUSCular route once now and then repeat in 2-6 months  -     diph,pertuss,acel,,tetanus vac,PF, (ADACEL) 2 Lf-(2.5-5-3-5 mcg)-5Lf/0.5 mL syrg vaccine; 0.5 mL by IntraMUSCular route once for 1 dose. 2. Anxiety  -     LORazepam (ATIVAN) 0.5 mg tablet; Take 1 Tab by mouth daily as needed for Anxiety. 3. Hyperlipidemia, unspecified hyperlipidemia type  -     HEPATIC FUNCTION PANEL; Future  -     LIPID PANEL; Future    4. Side effect of medication  -     METABOLIC PANEL, BASIC; Future    5. Other osteoporosis without current pathological fracture  -     DEXA BONE DENSITY STUDY AXIAL;  Future  -     VITAMIN D, 25 HYDROXY; Future        Health Maintenance Due   Topic Date Due    DTaP/Tdap/Td series (1 - Tdap) 10[birth year]/1953    Shingrix Vaccine Age 50> (1 of 2) 10/13/1992    GLAUCOMA SCREENING Q2Y  10/13/2007    Bone Densitometry (Dexa) Screening  10/13/2007    Pneumococcal 65+ years (2 of 2 - PPSV23) 07/26/2017    Lipid Screen  12/14/2019

## 2020-03-04 NOTE — PROGRESS NOTES
Identified pt with two pt identifiers(name and ). Reviewed record in preparation for visit and have obtained necessary documentation. Chief Complaint   Patient presents with    Complete Physical     [id number]        Health Maintenance Due   Topic    DTaP/Tdap/Td series (1 - Tdap)    Shingrix Vaccine Age 50> (1 of 2)    GLAUCOMA SCREENING Q2Y     Bone Densitometry (Dexa) Screening     Pneumococcal 65+ years (2 of 2 - PPSV23)    Lipid Screen        Coordination of Care Questionnaire:  :   1) Have you been to an emergency room, urgent care, or hospitalized since your last visit? If yes, where when, and reason for visit? NO       2. Have seen or consulted any other health care provider since your last visit? If yes, where when, and reason for visit? No       I have received verbal consent from Gunner Marquis to discuss any/all medical information while they are present in the room.

## 2020-08-27 DIAGNOSIS — E78.5 HYPERLIPIDEMIA, UNSPECIFIED HYPERLIPIDEMIA TYPE: Primary | ICD-10-CM

## 2020-08-27 RX ORDER — ROSUVASTATIN CALCIUM 10 MG/1
10 TABLET, COATED ORAL DAILY
Qty: 90 TAB | Refills: 1 | Status: SHIPPED | OUTPATIENT
Start: 2020-08-27 | End: 2021-02-09 | Stop reason: SDUPTHER

## 2020-08-27 NOTE — TELEPHONE ENCOUNTER
08/27/20  5:26 PM    1. Hyperlipidemia, unspecified hyperlipidemia type    - rosuvastatin (CRESTOR) 10 mg tablet; Take 1 Tab by mouth daily. Dispense: 90 Tab;  Refill: 1      Timothy Mckinney MD

## 2020-09-08 ENCOUNTER — OFFICE VISIT (OUTPATIENT)
Dept: FAMILY MEDICINE CLINIC | Age: 80
End: 2020-09-08
Payer: MEDICARE

## 2020-09-08 VITALS
WEIGHT: 122 LBS | RESPIRATION RATE: 16 BRPM | DIASTOLIC BLOOD PRESSURE: 70 MMHG | HEIGHT: 65 IN | OXYGEN SATURATION: 96 % | TEMPERATURE: 98.6 F | SYSTOLIC BLOOD PRESSURE: 109 MMHG | HEART RATE: 74 BPM | BODY MASS INDEX: 20.33 KG/M2

## 2020-09-08 DIAGNOSIS — E78.5 HYPERLIPIDEMIA, UNSPECIFIED HYPERLIPIDEMIA TYPE: Primary | ICD-10-CM

## 2020-09-08 DIAGNOSIS — F41.9 ANXIETY: ICD-10-CM

## 2020-09-08 DIAGNOSIS — R00.2 HEART PALPITATIONS: ICD-10-CM

## 2020-09-08 PROCEDURE — 1090F PRES/ABSN URINE INCON ASSESS: CPT | Performed by: FAMILY MEDICINE

## 2020-09-08 PROCEDURE — G8427 DOCREV CUR MEDS BY ELIG CLIN: HCPCS | Performed by: FAMILY MEDICINE

## 2020-09-08 PROCEDURE — G8420 CALC BMI NORM PARAMETERS: HCPCS | Performed by: FAMILY MEDICINE

## 2020-09-08 PROCEDURE — G8399 PT W/DXA RESULTS DOCUMENT: HCPCS | Performed by: FAMILY MEDICINE

## 2020-09-08 PROCEDURE — G8536 NO DOC ELDER MAL SCRN: HCPCS | Performed by: FAMILY MEDICINE

## 2020-09-08 PROCEDURE — G8510 SCR DEP NEG, NO PLAN REQD: HCPCS | Performed by: FAMILY MEDICINE

## 2020-09-08 PROCEDURE — 99213 OFFICE O/P EST LOW 20 MIN: CPT | Performed by: FAMILY MEDICINE

## 2020-09-08 PROCEDURE — 1101F PT FALLS ASSESS-DOCD LE1/YR: CPT | Performed by: FAMILY MEDICINE

## 2020-09-08 RX ORDER — LORAZEPAM 0.5 MG/1
0.5 TABLET ORAL
Qty: 30 TAB | Refills: 1 | Status: SHIPPED | OUTPATIENT
Start: 2020-09-08 | End: 2021-07-14 | Stop reason: SDUPTHER

## 2020-09-08 NOTE — PROGRESS NOTES
Assessment and Plan    1. Hyperlipidemia, unspecified hyperlipidemia type  Continue statin    2. Anxiety  Refilled med for situational anxiety and insomnia  - LORazepam (ATIVAN) 0.5 mg tablet; Take 1 Tab by mouth daily as needed for Anxiety. Dispense: 30 Tab; Refill: 1    3. Heart palpitations  Continue flecanide and beta blocker  FU per Dr. Nico [facility]      Follow-up and Dispositions    · Return in about 6 months (around 3/8/2021) for Medication follow up. Diagnosis and plan discussed with patient who verbillized understanding. History of present illness:Siena Garay is a 70 y.o. female presenting for Medication Refill    Hypercholetsterolemia  On statin  Takes consistently    Insominia and anxiety  Takes prn benzos  No side effects   looks OK, no signs of abuse or diversion. Review of Systems   Constitutional: Negative for chills and fever. HENT: Negative for congestion and sore throat. Respiratory: Negative for cough and shortness of breath. Cardiovascular: Positive for palpitations. Negative for chest pain and leg swelling. Psychiatric/Behavioral: Negative for depression. The patient is nervous/anxious and has insomnia. Past Medical History:   Diagnosis Date    Colonic polyp     Gastroesophageal reflux disease     Hemorrhagic cystitis     Hyperlipemia     Impaired fasting glucose     5/9/2012 106     Irritable bowel syndrome (IBS)     Osteoporosis     was on Bisphosphonate over 5 years    Palpitations     flecanide and metoprolol.  Pigmented skin lesion     Face ( Benign)     Sudeck's atrophy     PHof  Sudec's Atrophy     Urinary tract infection     Recurrent Frequent     Past Surgical History:   Procedure Laterality Date    HX APPENDECTOMY      HX COLONOSCOPY      age 69, repeat 8 years, Dr. Blade Bob.     HX OTHER SURGICAL      Fracture  right  [place]      Family History   Family history unknown: Yes     Social History     Socioeconomic History  Marital status:      Spouse name: Not on file    Number of children: Not on file    Years of education: Not on file    Highest education level: Not on file   Occupational History    Not on file   Social Needs    Financial resource strain: Not on file    Food insecurity     Worry: Not on file     Inability: Not on file    Transportation needs     Medical: Not on file     Non-medical: Not on file   Tobacco Use    Smoking status: Never Smoker    Smokeless tobacco: Never Used   Substance and Sexual Activity    Alcohol use: No     Frequency: Never    Drug use: No    Sexual activity: Yes   Lifestyle    Physical activity     Days per week: Not on file     Minutes per session: Not on file    Stress: Not on file   Relationships    Social connections     Talks on phone: Not on file     Gets together: Not on file     Attends Buddhist service: Not on file     Active member of club or organization: Not on file     Attends meetings of clubs or organizations: Not on file     Relationship status: Not on file    Intimate partner violence     Fear of current or ex partner: Not on file     Emotionally abused: Not on file     Physically abused: Not on file     Forced sexual activity: Not on file   Other Topics Concern    Not on file   Social History Narrative    Not on file         Prior to Admission medications    Medication Sig Start Date End Date Taking? Authorizing Provider   LORazepam (ATIVAN) 0.5 mg tablet Take 1 Tab by mouth daily as needed for Anxiety. 9/8/20  Yes Sarah Davis MD   rosuvastatin (CRESTOR) 10 mg tablet Take 1 Tab by mouth daily.  8/27/20  Yes Sarah Davis MD   varicella-zoster recombinant, PF, (SHINGRIX, PF,) 50 mcg/0.5 mL susr injection 0.5mL by IntraMUSCular route once now and then repeat in 2-6 months 3/4/20  Yes Sarah Davis MD   metoprolol succinate (TOPROL-XL) 25 mg XL tablet TAKE 1 TABLET BY MOUTH EVERY DAY 6/13/19  Yes Carlos Jack, SAM calcium-cholecalciferol, D3, (CALTRATE 600+D) tablet Take 1 Tab by mouth daily. Yes Provider, Historical   ascorbic acid, vitamin C, (VITAMIN C) 500 mg tablet Take 500 mg by mouth daily. Yes Provider, Historical   cranberry 500 mg capsule Take 500 mg by mouth daily. Yes Provider, Historical   acetaminophen (TYLENOL) 325 mg tablet Take  by mouth every four (4) hours as needed for Pain. Yes Provider, Historical   flecainide (TAMBOCOR) 50 mg tablet TK 1 T PO Q 12 H 11/7/18  Yes Provider, Historical   LORazepam (ATIVAN) 0.5 mg tablet Take 1 Tab by mouth daily as needed for Anxiety. 3/4/20 9/8/20  Nedra Swift MD   diclofenac (VOLTAREN) 1 % gel Apply  to affected area four (4) times daily. 6/5/19   Nedra Swift MD        Allergies   Allergen Reactions    Flu Vac 2015 (65 Up)-Mf59c(Pf) Rash    Sulfa (Sulfonamide Antibiotics) Rash       Vitals:    09/08/20 1156   BP: 109/70   Pulse: 74   Resp: 16   Temp: 98.6 °F (37 °C)   TempSrc: Oral   SpO2: 96%   Weight: 122 lb (55.3 kg)   Height: 5' 5\" (1.651 m)     Body mass index is 20.3 kg/m². Objective  Physical Exam  Vitals signs and nursing note reviewed. Constitutional:       Appearance: Normal appearance. She is not toxic-appearing. HENT:      Head: Normocephalic and atraumatic. Neck:      Musculoskeletal: No muscular tenderness. Cardiovascular:      Rate and Rhythm: Normal rate and regular rhythm. Heart sounds: Normal heart sounds. No murmur. No gallop. Pulmonary:      Effort: Pulmonary effort is normal. No respiratory distress. Breath sounds: Normal breath sounds. No wheezing, rhonchi or rales. Lymphadenopathy:      Cervical: No cervical adenopathy. Neurological:      Mental Status: She is alert. Psychiatric:         Attention and Perception: Attention and perception normal.         Mood and Affect: Mood normal.         Speech: Speech normal.         Behavior: Behavior normal. Behavior is cooperative.          Thought Content: Thought content normal.         Cognition and Memory: Cognition and memory normal.         Judgment: Judgment normal.      Comments: Mildly anxious.

## 2020-09-08 NOTE — PROGRESS NOTES
Identified pt with two pt identifiers(name and ). Reviewed record in preparation for visit and have obtained necessary documentation. Chief Complaint   Patient presents with    Medication Refill        Health Maintenance Due   Topic    DTaP/Tdap/Td series (1 - Tdap)    Shingrix Vaccine Age 50> (1 of 2)    GLAUCOMA SCREENING Q2Y     Pneumococcal 65+ years (2 of 2 - PPSV23)    Flu Vaccine (1)       Visit Vitals  Blood Pressure 109/70 (BP 1 Location: Left arm, BP Patient Position: Sitting)   Pulse 74   Temperature 98.6 °F (37 °C) (Oral)   Respiration 16   Height 5' 5\" (1.651 m)   Weight 122 lb (55.3 kg)   Oxygen Saturation 96%   Body Mass Index 20.30 kg/m²         Coordination of Care Questionnaire:  :   1) Have you been to an emergency room, urgent care, or hospitalized since your last visit? No      2. Have seen or consulted any other health care provider since your last visit?   no          Patient is accompanied by  I have received verbal consent from Gunner Marquis to discuss any/all medical information while they are present in the room.

## 2021-01-13 ENCOUNTER — OFFICE VISIT (OUTPATIENT)
Dept: FAMILY MEDICINE CLINIC | Age: 81
End: 2021-01-13
Payer: MEDICARE

## 2021-01-13 VITALS
SYSTOLIC BLOOD PRESSURE: 115 MMHG | WEIGHT: 125.6 LBS | DIASTOLIC BLOOD PRESSURE: 76 MMHG | TEMPERATURE: 97.5 F | HEART RATE: 67 BPM | OXYGEN SATURATION: 98 % | RESPIRATION RATE: 14 BRPM | HEIGHT: 65 IN | BODY MASS INDEX: 20.93 KG/M2

## 2021-01-13 DIAGNOSIS — R20.0 NUMBNESS AND TINGLING OF BOTH FEET: Primary | ICD-10-CM

## 2021-01-13 DIAGNOSIS — R20.2 NUMBNESS AND TINGLING OF BOTH FEET: Primary | ICD-10-CM

## 2021-01-13 PROCEDURE — G8432 DEP SCR NOT DOC, RNG: HCPCS | Performed by: FAMILY MEDICINE

## 2021-01-13 PROCEDURE — G8427 DOCREV CUR MEDS BY ELIG CLIN: HCPCS | Performed by: FAMILY MEDICINE

## 2021-01-13 PROCEDURE — 1090F PRES/ABSN URINE INCON ASSESS: CPT | Performed by: FAMILY MEDICINE

## 2021-01-13 PROCEDURE — 1101F PT FALLS ASSESS-DOCD LE1/YR: CPT | Performed by: FAMILY MEDICINE

## 2021-01-13 PROCEDURE — G8536 NO DOC ELDER MAL SCRN: HCPCS | Performed by: FAMILY MEDICINE

## 2021-01-13 PROCEDURE — G8420 CALC BMI NORM PARAMETERS: HCPCS | Performed by: FAMILY MEDICINE

## 2021-01-13 PROCEDURE — 99213 OFFICE O/P EST LOW 20 MIN: CPT | Performed by: FAMILY MEDICINE

## 2021-01-13 PROCEDURE — G8399 PT W/DXA RESULTS DOCUMENT: HCPCS | Performed by: FAMILY MEDICINE

## 2021-01-13 NOTE — PROGRESS NOTES
Assessment and Plan    1. Numbness and tingling of both feet  Plantar surfaces only. No weakness, no back pain, normal B12 already  Possible tarsal tunnel, could be from back but no symptoms  Only med that would be suspect is flecainide and she has been on that for years. TO Dr. Cayla Moreno. - REFERRAL TO ORTHOPEDICS      Follow-up and Dispositions    · Return in about 6 weeks (around 2/24/2021) for follow up numbness. Diagnosis and plan discussed with patient who verbillized understanding. History of present illness:Siena German is a 68 y.o. female presenting for Numbness (In Bilateral feet )    For last seven months developed numbness \"out of the blue\"  Felt like she was walking on cotton. If she drinks more water it bothers her less. Went to [facility] had flu shot and reaction to it. History of Pituitary tumor, benign and gets MRI yearly. Goes to Dr. Anabela Castro. Had test for B12 that it was normal.   Entire bottom of both feet. No pain at all in feet, no foot injuries or shoe problems  No back or neck pain      Review of Systems   Constitutional: Negative for chills, fever and weight loss. HENT: Negative for congestion and sore throat. Respiratory: Negative. Negative for cough and shortness of breath. Cardiovascular: Negative for chest pain and palpitations. Musculoskeletal: Negative for back pain and joint pain. Neurological: Positive for tingling. Negative for focal weakness and weakness. Psychiatric/Behavioral: Negative. Past Medical History:   Diagnosis Date    Colonic polyp     Gastroesophageal reflux disease     Hemorrhagic cystitis     History of benign pituitary tumor     Hyperlipemia     Impaired fasting glucose     5/9/2012 106     Irritable bowel syndrome (IBS)     Osteoporosis     was on Bisphosphonate over 5 years    Palpitations     flecanide and metoprolol.     Pigmented skin lesion     Face ( Benign)     Sudeck's atrophy     PHof Sudec's Atrophy     Urinary tract infection     Recurrent Frequent     Past Surgical History:   Procedure Laterality Date    HX APPENDECTOMY      HX COLONOSCOPY      age 69, repeat 8 years, Dr. Karuna Greene.  HX OTHER SURGICAL      Fracture  right  [place]      Family History   Family history unknown: Yes     Social History     Socioeconomic History    Marital status:      Spouse name: Not on file    Number of children: Not on file    Years of education: Not on file    Highest education level: Not on file   Occupational History    Not on file   Social Needs    Financial resource strain: Not on file    Food insecurity     Worry: Not on file     Inability: Not on file    Transportation needs     Medical: Not on file     Non-medical: Not on file   Tobacco Use    Smoking status: Never Smoker    Smokeless tobacco: Never Used   Substance and Sexual Activity    Alcohol use: No     Frequency: Never    Drug use: No    Sexual activity: Yes   Lifestyle    Physical activity     Days per week: Not on file     Minutes per session: Not on file    Stress: Not on file   Relationships    Social connections     Talks on phone: Not on file     Gets together: Not on file     Attends Mandaeism service: Not on file     Active member of club or organization: Not on file     Attends meetings of clubs or organizations: Not on file     Relationship status: Not on file    Intimate partner violence     Fear of current or ex partner: Not on file     Emotionally abused: Not on file     Physically abused: Not on file     Forced sexual activity: Not on file   Other Topics Concern    Not on file   Social History Narrative    Not on file         Prior to Admission medications    Medication Sig Start Date End Date Taking? Authorizing Provider   LORazepam (ATIVAN) 0.5 mg tablet Take 1 Tab by mouth daily as needed for Anxiety. 9/8/20   Xu Bustamante MD   rosuvastatin (CRESTOR) 10 mg tablet Take 1 Tab by mouth daily. 8/27/20   Trinda Boxer, MD   varicella-zoster recombinant, PF, (SHINGRIX, PF,) 50 mcg/0.5 mL susr injection 0.5mL by IntraMUSCular route once now and then repeat in 2-6 months 3/4/20   Trinda Boxer, MD   metoprolol succinate (TOPROL-XL) 25 mg XL tablet TAKE 1 TABLET BY MOUTH EVERY DAY 6/13/19   Ginny Jack NP   calcium-cholecalciferol, D3, (CALTRATE 600+D) tablet Take 1 Tab by mouth daily. Provider, Historical   ascorbic acid, vitamin C, (VITAMIN C) 500 mg tablet Take 500 mg by mouth daily. Provider, Historical   cranberry 500 mg capsule Take 500 mg by mouth daily. Provider, Historical   acetaminophen (TYLENOL) 325 mg tablet Take  by mouth every four (4) hours as needed for Pain. Provider, Historical   diclofenac (VOLTAREN) 1 % gel Apply  to affected area four (4) times daily. 6/5/19   Trinda Boxer, MD   flecainide (TAMBOCOR) 50 mg tablet TK 1 T PO Q 12 H 11/7/18   Provider, Historical        Allergies   Allergen Reactions    Flu Vac 2015 (65 Up)-Mf59c(Pf) Rash    Sulfa (Sulfonamide Antibiotics) Rash       Vitals:    01/13/21 1053   BP: 115/76   Pulse: 67   Resp: 14   Temp: 97.5 °F (36.4 °C)   TempSrc: Oral   SpO2: 98%   Weight: 125 lb 9.6 oz (57 kg)   Height: 5' 5\" (1.651 m)     Body mass index is 20.9 kg/m². Objective  Physical Exam  General: Patient alert and oriented and in NAD  Feet- normal exam. No Tinel's over tarsal tunnel. Brisk reflexes but symmetric and no clonus. No marked numbess on sensory testing, Motor function of feet normal:  Plantar and dorsiflexion and eversion all normal.  Psych: Appropriate mood and affect, no homicidal or suicidal ideation, no obsessions, delusions or hallucinations, normal psychomotor status.

## 2021-02-09 DIAGNOSIS — E78.5 HYPERLIPIDEMIA, UNSPECIFIED HYPERLIPIDEMIA TYPE: ICD-10-CM

## 2021-02-10 RX ORDER — ROSUVASTATIN CALCIUM 10 MG/1
10 TABLET, COATED ORAL DAILY
Qty: 90 TAB | Refills: 1 | Status: SHIPPED | OUTPATIENT
Start: 2021-02-10 | End: 2021-07-14 | Stop reason: SDUPTHER

## 2021-07-14 ENCOUNTER — OFFICE VISIT (OUTPATIENT)
Dept: FAMILY MEDICINE CLINIC | Age: 81
End: 2021-07-14
Payer: MEDICARE

## 2021-07-14 VITALS
HEIGHT: 65 IN | DIASTOLIC BLOOD PRESSURE: 73 MMHG | TEMPERATURE: 97.2 F | WEIGHT: 122.4 LBS | BODY MASS INDEX: 20.39 KG/M2 | SYSTOLIC BLOOD PRESSURE: 112 MMHG | RESPIRATION RATE: 16 BRPM | HEART RATE: 68 BPM | OXYGEN SATURATION: 97 %

## 2021-07-14 DIAGNOSIS — R60.9 EDEMA, UNSPECIFIED TYPE: ICD-10-CM

## 2021-07-14 DIAGNOSIS — Z11.59 ENCOUNTER FOR HEPATITIS C SCREENING TEST FOR LOW RISK PATIENT: ICD-10-CM

## 2021-07-14 DIAGNOSIS — F41.9 ANXIETY: ICD-10-CM

## 2021-07-14 DIAGNOSIS — Z00.00 MEDICARE ANNUAL WELLNESS VISIT, SUBSEQUENT: Primary | ICD-10-CM

## 2021-07-14 DIAGNOSIS — E78.5 HYPERLIPIDEMIA, UNSPECIFIED HYPERLIPIDEMIA TYPE: ICD-10-CM

## 2021-07-14 LAB
ALBUMIN SERPL-MCNC: 3.8 G/DL (ref 3.5–5)
ALBUMIN/GLOB SERPL: 1.2 {RATIO} (ref 1.1–2.2)
ALP SERPL-CCNC: 60 U/L (ref 45–117)
ALT SERPL-CCNC: 45 U/L (ref 12–78)
ANION GAP SERPL CALC-SCNC: 5 MMOL/L (ref 5–15)
AST SERPL-CCNC: 34 U/L (ref 15–37)
BILIRUB DIRECT SERPL-MCNC: 0.3 MG/DL (ref 0–0.2)
BILIRUB SERPL-MCNC: 0.9 MG/DL (ref 0.2–1)
BUN SERPL-MCNC: 15 MG/DL (ref 6–20)
BUN/CREAT SERPL: 21 (ref 12–20)
CALCIUM SERPL-MCNC: 9.6 MG/DL (ref 8.5–10.1)
CHLORIDE SERPL-SCNC: 106 MMOL/L (ref 97–108)
CHOLEST SERPL-MCNC: 156 MG/DL
CO2 SERPL-SCNC: 29 MMOL/L (ref 21–32)
COMMENT, HOLDF: NORMAL
CREAT SERPL-MCNC: 0.7 MG/DL (ref 0.55–1.02)
CREAT UR-MCNC: 107 MG/DL
GLOBULIN SER CALC-MCNC: 3.1 G/DL (ref 2–4)
GLUCOSE SERPL-MCNC: 96 MG/DL (ref 65–100)
HCV AB SERPL QL IA: NONREACTIVE
HCV COMMENT,HCGAC: NORMAL
HDLC SERPL-MCNC: 77 MG/DL
HDLC SERPL: 2 {RATIO} (ref 0–5)
LDLC SERPL CALC-MCNC: 65 MG/DL (ref 0–100)
MICROALBUMIN UR-MCNC: 3.62 MG/DL
MICROALBUMIN/CREAT UR-RTO: 34 MG/G (ref 0–30)
POTASSIUM SERPL-SCNC: 4.5 MMOL/L (ref 3.5–5.1)
PROT SERPL-MCNC: 6.9 G/DL (ref 6.4–8.2)
SAMPLES BEING HELD,HOLD: NORMAL
SODIUM SERPL-SCNC: 140 MMOL/L (ref 136–145)
TRIGL SERPL-MCNC: 70 MG/DL (ref ?–150)
VLDLC SERPL CALC-MCNC: 14 MG/DL

## 2021-07-14 PROCEDURE — 99214 OFFICE O/P EST MOD 30 MIN: CPT | Performed by: FAMILY MEDICINE

## 2021-07-14 PROCEDURE — 1101F PT FALLS ASSESS-DOCD LE1/YR: CPT | Performed by: FAMILY MEDICINE

## 2021-07-14 PROCEDURE — G8427 DOCREV CUR MEDS BY ELIG CLIN: HCPCS | Performed by: FAMILY MEDICINE

## 2021-07-14 PROCEDURE — 1090F PRES/ABSN URINE INCON ASSESS: CPT | Performed by: FAMILY MEDICINE

## 2021-07-14 PROCEDURE — G0439 PPPS, SUBSEQ VISIT: HCPCS | Performed by: FAMILY MEDICINE

## 2021-07-14 PROCEDURE — G8536 NO DOC ELDER MAL SCRN: HCPCS | Performed by: FAMILY MEDICINE

## 2021-07-14 PROCEDURE — G8420 CALC BMI NORM PARAMETERS: HCPCS | Performed by: FAMILY MEDICINE

## 2021-07-14 PROCEDURE — G8510 SCR DEP NEG, NO PLAN REQD: HCPCS | Performed by: FAMILY MEDICINE

## 2021-07-14 PROCEDURE — G8399 PT W/DXA RESULTS DOCUMENT: HCPCS | Performed by: FAMILY MEDICINE

## 2021-07-14 RX ORDER — ROSUVASTATIN CALCIUM 10 MG/1
10 TABLET, COATED ORAL DAILY
Qty: 90 TABLET | Refills: 1 | Status: SHIPPED | OUTPATIENT
Start: 2021-07-14 | End: 2022-01-28 | Stop reason: SDUPTHER

## 2021-07-14 RX ORDER — LORAZEPAM 0.5 MG/1
0.5 TABLET ORAL
Qty: 30 TABLET | Refills: 0 | Status: SHIPPED | OUTPATIENT
Start: 2021-07-14 | End: 2022-03-21 | Stop reason: SDUPTHER

## 2021-07-14 NOTE — PATIENT INSTRUCTIONS

## 2021-07-14 NOTE — PROGRESS NOTES
Manuelito Prajapati  68 y.o. female  1940  [id number]  [facility] MEDICINE  Progress Note     Encounter Date: 7/14/2021    Assessment and Plan:     Encounter Diagnoses     ICD-10-CM ICD-9-CM   1. Medicare annual wellness visit, subsequent  Z00.00 V70.0   2. Anxiety  F41.9 300.00   3. Hyperlipidemia, unspecified hyperlipidemia type  E78.5 272.4   4. Edema, unspecified type  R60.9 782.3   5. Encounter for hepatitis C screening test for low risk patient  Z11.59 V73.89       1. Medicare annual wellness visit, subsequent  Updated  Had COVID vaccine    2. Anxiety  Refilled. No signs of diversion or misuse. No falls  - LORazepam (ATIVAN) 0.5 mg tablet; Take 1 Tablet by mouth daily as needed for Anxiety. Dispense: 30 Tablet; Refill: 0    3. Hyperlipidemia, unspecified hyperlipidemia type  Continue statin  - rosuvastatin (CRESTOR) 10 mg tablet; Take 1 Tablet by mouth daily. Dispense: 90 Tablet; Refill: 1  - LIPID PANEL; Future  - HEPATIC FUNCTION PANEL; Future    4. Edema, unspecified type  Likely venous so recommended support stockings. She will investigate. - METABOLIC PANEL, BASIC; Future  - HEPATIC FUNCTION PANEL; Future  - MICROALBUMIN, UR, RAND W/ MICROALB/CREAT RATIO; Future    5. Encounter for hepatitis C screening test for low risk patient  screening  - HEPATITIS C AB; Future      I have discussed the diagnosis with the patient and the intended plan as seen in the above orders. she has expressed understanding. The patient has received an after-visit summary and questions were answered concerning future plans. I have discussed medication side effects and warnings with the patient as well. Electronically Signed: Gabriela Ashley MD    Current Medications after this visit     Current Outpatient Medications   Medication Sig    LORazepam (ATIVAN) 0.5 mg tablet Take 1 Tablet by mouth daily as needed for Anxiety.     rosuvastatin (CRESTOR) 10 mg tablet Take 1 Tablet by mouth daily.    metoprolol succinate (TOPROL-XL) 25 mg XL tablet TAKE 1 TABLET BY MOUTH EVERY DAY    calcium-cholecalciferol, D3, (CALTRATE 600+D) tablet Take 1 Tab by mouth daily.  ascorbic acid, vitamin C, (VITAMIN C) 500 mg tablet Take 500 mg by mouth daily.  cranberry 500 mg capsule Take 500 mg by mouth daily.  acetaminophen (TYLENOL) 325 mg tablet Take  by mouth every four (4) hours as needed for Pain.  diclofenac (VOLTAREN) 1 % gel Apply  to affected area four (4) times daily.  flecainide (TAMBOCOR) 50 mg tablet TK 1 T PO Q 12 H     No current facility-administered medications for this visit. Medications Discontinued During This Encounter   Medication Reason    varicella-zoster recombinant, PF, (SHINGRIX, PF,) 50 mcg/0.5 mL susr injection Not A Current Medication    LORazepam (ATIVAN) 0.5 mg tablet REORDER    rosuvastatin (CRESTOR) 10 mg tablet REORDER     ~~~~~~~~~~~~~~~~~~~~~~~~~~~~~~~~~~~~~~~~~~~~~~~~~~~~~~~~~~~    Chief Complaint   Patient presents with    Leg Swelling     Bilateral leg swelling    Follow-up     Medication       History provided by patient  History of Present Illness   Sienael I Humberto Schaumann is a 68 y.o. female who presents to clinic today for:  Leg Swelling (Bilateral leg swelling) and Follow-up (Medication)    Edema  Gone when she is recumbent  Has not tried support stockings  No leg pain    Anxiety  Takes prn lorazepam  Needs refill   OK    Hypercholesterolemia  On statin  Needs refills    Health Maintenance  Completed HM gaps at today's visit  Health Maintenance Due   Topic Date Due    Hepatitis C Screening  Never done    COVID-19 Vaccine (1) Never done    Lipid Screen  03/04/2021     Review of Systems   Review of Systems   Constitutional: Negative for chills, fever and weight loss. Respiratory: Negative for cough and shortness of breath. Cardiovascular: Positive for leg swelling. Negative for chest pain and palpitations. Psychiatric/Behavioral: Negative. Vitals/Objective:     Vitals:    21 1111   BP: 112/73   Pulse: 68   Resp: 16   Temp: 97.2 °F (36.2 °C)   TempSrc: Temporal   SpO2: 97%   Weight: 122 lb 6.4 oz (55.5 kg)   Height: 5' 5\" (1.651 m)     Body mass index is 20.37 kg/m². Wt Readings from Last 3 Encounters:   21 122 lb 6.4 oz (55.5 kg)   21 125 lb 9.6 oz (57 kg)   20 122 lb (55.3 kg)         Objective  Physical Exam  Vitals and nursing note reviewed. Constitutional:       Appearance: Normal appearance. She is not toxic-appearing. Comments: thin   HENT:      Head: Normocephalic and atraumatic. Cardiovascular:      Rate and Rhythm: Normal rate and regular rhythm. Heart sounds: Normal heart sounds. No murmur heard. No gallop. Pulmonary:      Effort: Pulmonary effort is normal. No respiratory distress. Breath sounds: Normal breath sounds. No wheezing, rhonchi or rales. Musculoskeletal:      Cervical back: No muscular tenderness. Right lower le+ Pitting Edema present. Left lower le+ Pitting Edema present. Lymphadenopathy:      Cervical: No cervical adenopathy. Neurological:      Mental Status: She is alert. Psychiatric:         Mood and Affect: Mood normal.         Behavior: Behavior normal.         Thought Content: Thought content normal.         Judgment: Judgment normal.           No results found for this or any previous visit (from the past 24 hour(s)). Disposition     Follow-up and Dispositions  ·   Return in about 6 months (around 2022) for Medication follow up. No future appointments. History   Patient's past medical, surgical and family histories were reviewed and updated.     Past Medical History:   Diagnosis Date    Colonic polyp     Gastroesophageal reflux disease     Hemorrhagic cystitis     History of benign pituitary tumor     macroadenoma by MRI, normal labs, followed with yearly MRI    Hyperlipemia     Impaired fasting glucose     2012 106     Irritable bowel syndrome (IBS)     Osteoporosis     was on Bisphosphonate over 5 years    Palpitations     flecanide and metoprolol.  Pigmented skin lesion     Face ( Benign)     Sudeck's atrophy     PHof  Sudec's Atrophy     Urinary tract infection     Recurrent Frequent     Past Surgical History:   Procedure Laterality Date    HX APPENDECTOMY      HX COLONOSCOPY      age 69, repeat 8 years, Dr. Mitch Avila.  HX OTHER SURGICAL      Fracture  right  [place]      Family History   Family history unknown: Yes     Social History     Tobacco Use    Smoking status: Never Smoker    Smokeless tobacco: Never Used   Substance Use Topics    Alcohol use: No    Drug use: No       Allergies     Allergies   Allergen Reactions    Flu Vac 2015 (65 Up)-Mf59c(Pf) Rash    Sulfa (Sulfonamide Antibiotics) Rash                     This is the Subsequent Medicare Annual Wellness Exam, performed 12 months or more after the Initial AWV or the last Subsequent AWV    I have reviewed the patient's medical history in detail and updated the computerized patient record. Assessment/Plan   Education and counseling provided:  Are appropriate based on today's review and evaluation  End-of-Life planning (with patient's consent)    1. Medicare annual wellness visit, subsequent  2. Anxiety  -     LORazepam (ATIVAN) 0.5 mg tablet; Take 1 Tablet by mouth daily as needed for Anxiety., Normal, Disp-30 Tablet, R-0  3. Hyperlipidemia, unspecified hyperlipidemia type  -     rosuvastatin (CRESTOR) 10 mg tablet; Take 1 Tablet by mouth daily. , Normal, Disp-90 Tablet, R-1  -     LIPID PANEL; Future  -     HEPATIC FUNCTION PANEL; Future  4. Edema, unspecified type  -     METABOLIC PANEL, BASIC; Future  -     HEPATIC FUNCTION PANEL; Future  -     MICROALBUMIN, UR, RAND W/ MICROALB/CREAT RATIO; Future  5. Encounter for hepatitis C screening test for low risk patient  -     HEPATITIS C AB;  Future       Depression Risk Factor Screening     3 most recent PHQ Screens 7/14/2021   Little interest or pleasure in doing things Not at all   Feeling down, depressed, irritable, or hopeless Not at all   Total Score PHQ 2 0       Alcohol Risk Screen    Do you average more than 1 drink per night or more than 7 drinks a week:  No    On any one occasion in the past three months have you have had more than 3 drinks containing alcohol:  No    Non smoker    Functional Ability and Level of Safety    Hearing: Hearing is good. Vision:  Perfect   Dental:  Up to date   Activities of Daily Living: The home contains: no safety equipment. Patient does total self care      Ambulation: with mild difficulty left knee pain     Fall Risk:  Fall Risk Assessment, last 12 mths 7/14/2021   Able to walk? Yes   Fall in past 12 months? 0   Do you feel unsteady? 0   Are you worried about falling 0      Abuse Screen:  Patient is not abused       Cognitive Screening    Has your family/caregiver stated any concerns about your memory: no     Cognitive Screening: Normal - intreview    Health Maintenance Due     Health Maintenance Due   Topic Date Due    Hepatitis C Screening  Never done    COVID-19 Vaccine (1) Never done    Lipid Screen  03/04/2021       Patient Care Team   Patient Care Team:  Scooby Suarez MD as PCP - General (Family Medicine)  Scooby Suarez MD as PCP - REHABILITATION [facility] Empaneled Provider    History   There is no problem list on file for this patient. Past Medical History:   Diagnosis Date    Colonic polyp     Gastroesophageal reflux disease     Hemorrhagic cystitis     History of benign pituitary tumor     macroadenoma by MRI, normal labs, followed with yearly MRI    Hyperlipemia     Impaired fasting glucose     5/9/2012 106     Irritable bowel syndrome (IBS)     Osteoporosis     was on Bisphosphonate over 5 years    Palpitations     flecanide and metoprolol.     Pigmented skin lesion     Face ( Benign)     Sudeck's atrophy     PHof  Sudec's Atrophy     Urinary tract infection     Recurrent Frequent      Past Surgical History:   Procedure Laterality Date    HX APPENDECTOMY      HX COLONOSCOPY      age 69, repeat 8 years, Dr. Carlos Fernandez.  HX OTHER SURGICAL      Fracture  right  [place]      Current Outpatient Medications   Medication Sig Dispense Refill    LORazepam (ATIVAN) 0.5 mg tablet Take 1 Tablet by mouth daily as needed for Anxiety. 30 Tablet 0    rosuvastatin (CRESTOR) 10 mg tablet Take 1 Tablet by mouth daily. 90 Tablet 1    metoprolol succinate (TOPROL-XL) 25 mg XL tablet TAKE 1 TABLET BY MOUTH EVERY DAY 90 Tab 0    calcium-cholecalciferol, D3, (CALTRATE 600+D) tablet Take 1 Tab by mouth daily.  ascorbic acid, vitamin C, (VITAMIN C) 500 mg tablet Take 500 mg by mouth daily.  cranberry 500 mg capsule Take 500 mg by mouth daily.  acetaminophen (TYLENOL) 325 mg tablet Take  by mouth every four (4) hours as needed for Pain.  diclofenac (VOLTAREN) 1 % gel Apply  to affected area four (4) times daily.  100 g 2    flecainide (TAMBOCOR) 50 mg tablet TK 1 T PO Q 12 H  3     Allergies   Allergen Reactions    Flu Vac 2015 (65 Up)-Mf59c(Pf) Rash    Sulfa (Sulfonamide Antibiotics) Rash       Family History   Family history unknown: Yes     Social History     Tobacco Use    Smoking status: Never Smoker    Smokeless tobacco: Never Used   Substance Use Topics    Alcohol use: No         Gabriele Ballard MD

## 2021-07-15 NOTE — PROGRESS NOTES
Your lab work is fine. There is nothing that I see that would explain your edema so it is probably venous disease as we discussed. Work on finding some good support stockings. See me in 6 months and as needed.   [facility]

## 2021-07-23 ENCOUNTER — TELEPHONE (OUTPATIENT)
Dept: FAMILY MEDICINE CLINIC | Age: 81
End: 2021-07-23

## 2021-07-23 NOTE — TELEPHONE ENCOUNTER
Called patient, verified id [phone]. Gave patient verbal message per Dr. Fritz Heard. patint understood verbal message given.

## 2021-07-23 NOTE — TELEPHONE ENCOUNTER
----- Message from Meliza Serrano sent at 7/23/2021 10:26 AM EDT -----  Regarding: Dr. Darwin Maguire Message/Vendor Calls    Caller's first and last name: Pt       Reason for call: Saw Dr. Makayla John on 07/14/21 and on discharge papers underneath list of medications, one medication says to stop taking these medication and patient needs to know which medication this was on the list. Wants to speak with [facility]. Callback required yes/no and why: yes, to discuss       Best contact number(s): [phone]      Details to clarify the request: Can leave a detailed message if no answer.        Meliza Serrano

## 2021-07-23 NOTE — TELEPHONE ENCOUNTER
Call her. She should stay on the following medications:     LORazepam (ATIVAN) 0.5 mg tablet Take 1 Tablet by mouth daily as needed for Anxiety. rosuvastatin (CRESTOR) 10 mg tablet Take 1 Tablet by mouth daily. metoprolol succinate (TOPROL-XL) 25 mg XL tablet TAKE 1 TABLET BY MOUTH EVERY DAY    calcium-cholecalciferol, D3, (CALTRATE 600+D) tablet Take 1 Tab by mouth daily. ascorbic acid, vitamin C, (VITAMIN C) 500 mg tablet Take 500 mg by mouth daily. cranberry 500 mg capsule Take 500 mg by mouth daily. acetaminophen (TYLENOL) 325 mg tablet Take  by mouth every four (4) hours as needed for Pain. diclofenac (VOLTAREN) 1 % gel Apply  to affected area four (4) times daily.     flecainide (TAMBOCOR) 50 mg tablet      The instruction she can be confusing because sometimes we delete medications that are duplicated on the med list.  [facility]

## 2022-01-28 DIAGNOSIS — E78.5 HYPERLIPIDEMIA, UNSPECIFIED HYPERLIPIDEMIA TYPE: ICD-10-CM

## 2022-01-28 NOTE — TELEPHONE ENCOUNTER
PCP: Andre Putnam MD    Last appt: 7/14/2021  No future appointments. Requested Prescriptions     Pending Prescriptions Disp Refills    rosuvastatin (CRESTOR) 10 mg tablet 90 Tablet 1     Sig: Take 1 Tablet by mouth daily.        Prior labs and Blood pressures:  BP Readings from Last 3 Encounters:   07/14/21 112/73   01/13/21 115/76   09/08/20 109/70     Lab Results   Component Value Date/Time    Sodium 140 07/14/2021 12:15 PM    Potassium 4.5 07/14/2021 12:15 PM    Chloride 106 07/14/2021 12:15 PM    CO2 29 07/14/2021 12:15 PM    Anion gap 5 07/14/2021 12:15 PM    Glucose 96 07/14/2021 12:15 PM    BUN 15 07/14/2021 12:15 PM    Creatinine 0.70 07/14/2021 12:15 PM    BUN/Creatinine ratio 21 (H) 07/14/2021 12:15 PM    GFR est AA >60 07/14/2021 12:15 PM    GFR est non-AA >60 07/14/2021 12:15 PM    Calcium 9.6 07/14/2021 12:15 PM     No results found for: HBA1C, ZFY5TFDI, UVM4TZZP  Lab Results   Component Value Date/Time    Cholesterol, total 156 07/14/2021 12:15 PM    HDL Cholesterol 77 07/14/2021 12:15 PM    LDL, calculated 65 07/14/2021 12:15 PM    VLDL, calculated 14 07/14/2021 12:15 PM    Triglyceride 70 07/14/2021 12:15 PM    CHOL/HDL Ratio 2.0 07/14/2021 12:15 PM     Lab Results   Component Value Date/Time    Vitamin D 25-Hydroxy 35.1 03/04/2020 09:51 AM       No results found for: TSH, TSH2, TSH3, TSHP, TSHEXT

## 2022-01-31 RX ORDER — ROSUVASTATIN CALCIUM 10 MG/1
10 TABLET, COATED ORAL DAILY
Qty: 90 TABLET | Refills: 1 | Status: SHIPPED | OUTPATIENT
Start: 2022-01-31 | End: 2022-07-29

## 2022-03-21 ENCOUNTER — OFFICE VISIT (OUTPATIENT)
Dept: FAMILY MEDICINE CLINIC | Age: 82
End: 2022-03-21
Payer: MEDICARE

## 2022-03-21 VITALS
OXYGEN SATURATION: 98 % | DIASTOLIC BLOOD PRESSURE: 75 MMHG | BODY MASS INDEX: 20.33 KG/M2 | TEMPERATURE: 97 F | RESPIRATION RATE: 16 BRPM | HEART RATE: 79 BPM | HEIGHT: 65 IN | WEIGHT: 122 LBS | SYSTOLIC BLOOD PRESSURE: 119 MMHG

## 2022-03-21 DIAGNOSIS — F41.9 ANXIETY: Primary | ICD-10-CM

## 2022-03-21 DIAGNOSIS — K11.7 EXCESSIVE SALIVATION: ICD-10-CM

## 2022-03-21 PROCEDURE — G8427 DOCREV CUR MEDS BY ELIG CLIN: HCPCS | Performed by: FAMILY MEDICINE

## 2022-03-21 PROCEDURE — G8399 PT W/DXA RESULTS DOCUMENT: HCPCS | Performed by: FAMILY MEDICINE

## 2022-03-21 PROCEDURE — G8420 CALC BMI NORM PARAMETERS: HCPCS | Performed by: FAMILY MEDICINE

## 2022-03-21 PROCEDURE — 99213 OFFICE O/P EST LOW 20 MIN: CPT | Performed by: FAMILY MEDICINE

## 2022-03-21 PROCEDURE — G8536 NO DOC ELDER MAL SCRN: HCPCS | Performed by: FAMILY MEDICINE

## 2022-03-21 PROCEDURE — G8510 SCR DEP NEG, NO PLAN REQD: HCPCS | Performed by: FAMILY MEDICINE

## 2022-03-21 PROCEDURE — 1090F PRES/ABSN URINE INCON ASSESS: CPT | Performed by: FAMILY MEDICINE

## 2022-03-21 PROCEDURE — 1101F PT FALLS ASSESS-DOCD LE1/YR: CPT | Performed by: FAMILY MEDICINE

## 2022-03-21 RX ORDER — LORAZEPAM 0.5 MG/1
0.5 TABLET ORAL
Qty: 30 TABLET | Refills: 0 | Status: SHIPPED | OUTPATIENT
Start: 2022-03-21

## 2022-03-21 NOTE — PROGRESS NOTES
Ken Osborne is a 80 y.o. female      Chief Complaint   Patient presents with    Follow-up    Medication Refill     [facility]         1. Have you been to the ER, urgent care clinic since your last visit? Hospitalized since your last visit? No       2. Have you seen or consulted any other health care providers outside of the [address] [place] [address] since your last visit? Include any pap smears or colon screening.   No

## 2022-03-21 NOTE — PROGRESS NOTES
Herman Carroll  80 y.o. female  1940  [id number]  [facility] MEDICINE  Progress Note     Encounter Date: 3/21/2022    Assessment and Plan:     Encounter Diagnoses     ICD-10-CM ICD-9-CM   1. Anxiety  F41.9 300.00   2. Excessive salivation  K11.7 527.7       1. Anxiety  Refill benzo  Takes occasionally for sleep   is fine without signs of abuse or misuse  - LORazepam (ATIVAN) 0.5 mg tablet; Take 1 Tablet by mouth daily as needed for Anxiety. Dispense: 30 Tablet; Refill: 0    2. Excessive salivation  May try allegra or benedryl in those situations where it is really bothering her (like flying). Otherwise no rx. I have discussed the diagnosis with the patient and the intended plan as seen in the above orders. she has expressed understanding. The patient has received an after-visit summary and questions were answered concerning future plans. I have discussed medication side effects and warnings with the patient as well. Electronically Signed: Liz Jones MD    Current Medications after this visit     Current Outpatient Medications   Medication Sig    LORazepam (ATIVAN) 0.5 mg tablet Take 1 Tablet by mouth daily as needed for Anxiety.  rosuvastatin (CRESTOR) 10 mg tablet Take 1 Tablet by mouth daily.  metoprolol succinate (TOPROL-XL) 25 mg XL tablet TAKE 1 TABLET BY MOUTH EVERY DAY    calcium-cholecalciferol, D3, (CALTRATE 600+D) tablet Take 1 Tab by mouth daily.  ascorbic acid, vitamin C, (VITAMIN C) 500 mg tablet Take 500 mg by mouth daily.  cranberry 500 mg capsule Take 500 mg by mouth daily.  acetaminophen (TYLENOL) 325 mg tablet Take  by mouth every four (4) hours as needed for Pain.  diclofenac (VOLTAREN) 1 % gel Apply  to affected area four (4) times daily.  flecainide (TAMBOCOR) 50 mg tablet TK 1 T PO Q 12 H     No current facility-administered medications for this visit.      Medications Discontinued During This Encounter Medication Reason    LORazepam (ATIVAN) 0.5 mg tablet REORDER     ~~~~~~~~~~~~~~~~~~~~~~~~~~~~~~~~~~~~~~~~~~~~~~~~~~~~~~~~~~~    Chief Complaint   Patient presents with    Follow-up    Medication Refill     Ativan       History provided by patient  History of Present Illness   Siena KINCAID Apolonia Aguirre is a 80 y.o. female who presents to clinic today for:  Follow-up and Medication Refill (Ativan)    Lots of liquid in her mouth  Has the opposite of dry mouth. Has fluid run down her chin  Occurs more when stressed or rushed. Needs Kleenex. Needs refill lorazepam.  Takes PRN for sleep  Last refill was in July 2021 for 30 pills.  checked. NO signs of misuse or abuse or diversion. Still sees Dr. Cosmo Arrieta to follow pituitary       Health Maintenance  Will do at future visit  Health Maintenance Due   Topic Date Due    Pneumococcal 65+ years (2 of 2 - PPSV23) 07/26/2017    COVID-19 Vaccine (3 - Booster for Pfizer series) 07/27/2021     Review of Systems   Review of Systems   Respiratory: Negative for shortness of breath. Cardiovascular: Negative for chest pain and leg swelling. Gastrointestinal: Negative for blood in stool. Genitourinary: Negative for hematuria. Psychiatric/Behavioral: The patient has insomnia. Vitals/Objective:     Vitals:    03/21/22 1141   BP: 119/75   Pulse: 79   Resp: 16   Temp: 97 °F (36.1 °C)   TempSrc: Temporal   SpO2: 98%   Weight: 122 lb (55.3 kg)   Height: 5' 5\" (1.651 m)     Body mass index is 20.3 kg/m². Wt Readings from Last 3 Encounters:   03/21/22 122 lb (55.3 kg)   07/14/21 122 lb 6.4 oz (55.5 kg)   01/13/21 125 lb 9.6 oz (57 kg)         Objective  Physical Exam  Vitals and nursing note reviewed. Constitutional:       Appearance: Normal appearance. She is not toxic-appearing. HENT:      Head: Normocephalic and atraumatic. Cardiovascular:      Rate and Rhythm: Normal rate and regular rhythm. Heart sounds: Normal heart sounds. No murmur heard.   No gallop. Pulmonary:      Effort: Pulmonary effort is normal. No respiratory distress. Breath sounds: Normal breath sounds. No wheezing, rhonchi or rales. Musculoskeletal:      Cervical back: No muscular tenderness. Lymphadenopathy:      Cervical: No cervical adenopathy. Neurological:      Mental Status: She is alert. Psychiatric:         Mood and Affect: Mood normal.         Behavior: Behavior normal.         Thought Content: Thought content normal.         Judgment: Judgment normal.           No results found for this or any previous visit (from the past 24 hour(s)). Disposition     Follow-up and Dispositions  ·   Return in about 4 months (around 7/21/2022) for Annual exam.       No future appointments. History   Patient's past medical, surgical and family histories were reviewed and updated. Past Medical History:   Diagnosis Date    Colonic polyp     Gastroesophageal reflux disease     Hemorrhagic cystitis     History of benign pituitary tumor     macroadenoma by MRI, normal labs, followed with yearly MRI    Hyperlipemia     Impaired fasting glucose     5/9/2012 106     Irritable bowel syndrome (IBS)     Osteoporosis     was on Bisphosphonate over 5 years    Palpitations     flecanide and metoprolol.  Pigmented skin lesion     Face ( Benign)     Sudeck's atrophy     PHof  Sudec's Atrophy     Urinary tract infection     Recurrent Frequent     Past Surgical History:   Procedure Laterality Date    HX APPENDECTOMY      HX COLONOSCOPY      age 69, repeat 8 years, Dr. Jose Elias Barton.     HX OTHER SURGICAL      Fracture  right  [place]      Family History   Family history unknown: Yes     Social History     Tobacco Use    Smoking status: Never Smoker    Smokeless tobacco: Never Used   Substance Use Topics    Alcohol use: No    Drug use: No       Allergies     Allergies   Allergen Reactions    Flu Vac 2015 (65 Up)-Mf59c(Pf) Rash    Sulfa (Sulfonamide Antibiotics) Rash

## 2022-07-29 DIAGNOSIS — E78.5 HYPERLIPIDEMIA, UNSPECIFIED HYPERLIPIDEMIA TYPE: ICD-10-CM

## 2022-07-29 RX ORDER — ROSUVASTATIN CALCIUM 10 MG/1
10 TABLET, COATED ORAL DAILY
Qty: 90 TABLET | Refills: 1 | Status: SHIPPED | OUTPATIENT
Start: 2022-07-29

## 2022-12-12 ENCOUNTER — OFFICE VISIT (OUTPATIENT)
Dept: FAMILY MEDICINE CLINIC | Age: 82
End: 2022-12-12
Payer: MEDICARE

## 2022-12-12 VITALS
SYSTOLIC BLOOD PRESSURE: 133 MMHG | TEMPERATURE: 97.2 F | RESPIRATION RATE: 16 BRPM | HEART RATE: 75 BPM | BODY MASS INDEX: 21.39 KG/M2 | WEIGHT: 128.4 LBS | DIASTOLIC BLOOD PRESSURE: 83 MMHG | OXYGEN SATURATION: 97 % | HEIGHT: 65 IN

## 2022-12-12 DIAGNOSIS — R42 VERTIGO: ICD-10-CM

## 2022-12-12 DIAGNOSIS — E03.8 SUBCLINICAL HYPOTHYROIDISM: ICD-10-CM

## 2022-12-12 DIAGNOSIS — R79.89 ELEVATED TSH: ICD-10-CM

## 2022-12-12 DIAGNOSIS — E78.5 HYPERLIPIDEMIA, UNSPECIFIED HYPERLIPIDEMIA TYPE: ICD-10-CM

## 2022-12-12 DIAGNOSIS — D35.2 PITUITARY MACROADENOMA (HCC): ICD-10-CM

## 2022-12-12 DIAGNOSIS — Z00.00 MEDICARE ANNUAL WELLNESS VISIT, SUBSEQUENT: Primary | ICD-10-CM

## 2022-12-12 DIAGNOSIS — Z09 HOSPITAL DISCHARGE FOLLOW-UP: ICD-10-CM

## 2022-12-12 PROCEDURE — G8427 DOCREV CUR MEDS BY ELIG CLIN: HCPCS | Performed by: NURSE PRACTITIONER

## 2022-12-12 PROCEDURE — G8432 DEP SCR NOT DOC, RNG: HCPCS | Performed by: NURSE PRACTITIONER

## 2022-12-12 PROCEDURE — G0439 PPPS, SUBSEQ VISIT: HCPCS | Performed by: NURSE PRACTITIONER

## 2022-12-12 PROCEDURE — 1101F PT FALLS ASSESS-DOCD LE1/YR: CPT | Performed by: NURSE PRACTITIONER

## 2022-12-12 PROCEDURE — G8536 NO DOC ELDER MAL SCRN: HCPCS | Performed by: NURSE PRACTITIONER

## 2022-12-12 PROCEDURE — 99214 OFFICE O/P EST MOD 30 MIN: CPT | Performed by: NURSE PRACTITIONER

## 2022-12-12 PROCEDURE — 1123F ACP DISCUSS/DSCN MKR DOCD: CPT | Performed by: NURSE PRACTITIONER

## 2022-12-12 PROCEDURE — G8420 CALC BMI NORM PARAMETERS: HCPCS | Performed by: NURSE PRACTITIONER

## 2022-12-12 PROCEDURE — G8399 PT W/DXA RESULTS DOCUMENT: HCPCS | Performed by: NURSE PRACTITIONER

## 2022-12-12 PROCEDURE — 1090F PRES/ABSN URINE INCON ASSESS: CPT | Performed by: NURSE PRACTITIONER

## 2022-12-12 NOTE — PROGRESS NOTES
Identified pt with two pt identifiers(name and ). Reviewed record in preparation for visit and have obtained necessary documentation. Chief Complaint   Patient presents with    Hospital Follow Up     [facility] for vertigo        Health Maintenance Due   Topic    DTaP/Tdap/Td series (1 - Tdap)    Shingrix Vaccine Age 49> (1 of 2)    Pneumococcal 65+ years (2 - PPSV23 if available, else PCV20)    COVID-19 Vaccine (3 - Booster for [place] [facility] series)    Lipid Screen     Medicare Yearly Exam        Coordination of Care Questionnaire:  :   1) Have you been to an emergency room, urgent care, or hospitalized since your last visit? If yes, where when, and reason for visit? no       2. Have seen or consulted any other health care provider since your last visit? If yes, where when, and reason for visit? NO        Patient is accompanied by self I have received verbal consent from Gunner Marquis to discuss any/all medical information while they are present in the room.

## 2022-12-12 NOTE — PROGRESS NOTES
This is the Subsequent Medicare Annual Wellness Exam, performed 12 months or more after the Initial AWV or the last Subsequent AWV    I have reviewed the patient's medical history in detail and updated the computerized patient record. Here today for medicare wellness  No longer doing mammograms  States vision exam 2 years ago        Hospital follow up  Hospital notes including imaging and lab results are present at visit and reviewed  Was seen at [facility] on November 27 stayed overnight November 28  Went for dizziness  Work up with MRI and CT normal  Labwork showed elevated TSH at over 7    Vertigo  Resolved. Using meclizine as needed      Review of Systems   Constitutional:  Negative for chills, fever and weight loss. Eyes:  Negative for blurred vision. Respiratory:  Negative for cough and shortness of breath. Cardiovascular:  Negative for chest pain, palpitations and leg swelling. Gastrointestinal:  Negative for constipation, nausea and vomiting. Genitourinary:  Negative for dysuria. Musculoskeletal:  Negative for joint pain. Skin:  Negative for rash. Neurological:  Negative for dizziness and headaches. Psychiatric/Behavioral:  Negative for substance abuse and suicidal ideas. The patient does not have insomnia. Assessment/Plan   Education and counseling provided:  Are appropriate based on today's review and evaluation    1. Medicare annual wellness visit, subsequent   -Stable, preventive screenings discussed and up-to-date  2. Hospital discharge follow-up   [facility] Notes reviewed including imaging and lab work with patient  3. Elevated TSH  -     TSH 3RD GENERATION; Future  -     T4, FREE; Future  - Presumed stable, will repeat labs today with additional lab work  4. Pituitary macroadenoma ([address]  - Managed by neurology  5. Vertigo  - Resolved. Advised to seek urgent care for worsening symptoms   6. Hyperlipidemia, unspecified hyperlipidemia type  -     LIPID PANEL;  Future  - TSH 3RD GENERATION; Future  - Presumed stable labs in a couple weeks, follow up based on results   7. Subclinical hypothyroidism  -     T4, FREE; Future   - As above    Depression Risk Factor Screening     3 most recent PHQ Screens 3/21/2022   Little interest or pleasure in doing things Not at all   Feeling down, depressed, irritable, or hopeless Not at all   Total Score PHQ 2 0       Alcohol & Drug Abuse Risk Screen    Do you average more than 1 drink per night or more than 7 drinks a week:  No    On any one occasion in the past three months have you have had more than 3 drinks containing alcohol:  No          Functional Ability and Level of Safety    Hearing: Hearing is good. Activities of Daily Living: The home contains: no safety equipment. Patient does total self care      Ambulation: with no difficulty     Fall Risk:  Fall Risk Assessment, last 12 mths 3/21/2022   Able to walk? Yes   Fall in past 12 months? 0   Do you feel unsteady? 0   Are you worried about falling 0      Abuse Screen:  Patient is not abused       Cognitive Screening    Has your family/caregiver stated any concerns about your memory: no     Cognitive Screening: interview    Health Maintenance Due     Health Maintenance Due   Topic Date Due    DTaP/Tdap/Td series (1 - Tdap) Never done    Shingrix Vaccine Age 50> (1 of 2) Never done    Pneumococcal 65+ years (2 - PPSV23 if available, else PCV20) 07/26/2017    COVID-19 Vaccine (3 - Booster for Pfizer series) 04/24/2021    Lipid Screen  07/14/2022     Physical Exam  Constitutional:       Appearance: Normal appearance. HENT:      Head: Normocephalic and atraumatic. Right Ear: Tympanic membrane normal. There is no impacted cerumen. Left Ear: Tympanic membrane normal. There is no impacted cerumen. Nose: Nose normal. No congestion. Mouth/Throat:      Dentition: Normal dentition. Eyes:      General:         Right eye: No discharge. Left eye: No discharge. Conjunctiva/sclera:      Right eye: Right conjunctiva is not injected. Left eye: Left conjunctiva is not injected. Pupils: Pupils are equal, round, and reactive to light. Cardiovascular:      Rate and Rhythm: Normal rate and regular rhythm. Pulses:           Dorsalis pedis pulses are 2+ on the right side and 2+ on the left side. Posterior tibial pulses are 2+ on the right side and 2+ on the left side. Heart sounds: S1 normal and S2 normal. No murmur heard. No friction rub. No gallop. Pulmonary:      Effort: No respiratory distress. Breath sounds: Normal breath sounds. No wheezing. Abdominal:      General: Bowel sounds are normal. There is no distension. Palpations: Abdomen is soft. There is no mass. Tenderness: There is no abdominal tenderness. Musculoskeletal:         General: No swelling or tenderness. Normal range of motion. Cervical back: Neck supple. Lymphadenopathy:      Cervical: No cervical adenopathy. Skin:     General: Skin is warm and dry. Capillary Refill: Capillary refill takes less than 2 seconds. Findings: No rash. Neurological:      Mental Status: She is alert. Sensory: Sensation is intact. Patient Care Team   Patient Care Team:  Clay Mtz MD as PCP - General (Family Medicine)  Clay Mtz MD as PCP - [facility] Provider    History   There is no problem list on file for this patient. Past Medical History:   Diagnosis Date    Colonic polyp     Gastroesophageal reflux disease     Hemorrhagic cystitis     History of benign pituitary tumor     macroadenoma by MRI, normal labs, followed with yearly MRI    Hyperlipemia     Impaired fasting glucose     5/9/2012 106     Irritable bowel syndrome (IBS)     Osteoporosis     was on Bisphosphonate over 5 years    Palpitations     flecanide and metoprolol.     Pigmented skin lesion     Face ( Benign)     Sudeck's atrophy     PHof  Sudec's Atrophy Urinary tract infection     Recurrent Frequent      Past Surgical History:   Procedure Laterality Date    HX APPENDECTOMY      HX COLONOSCOPY      age 69, repeat 8 years, Dr. Matilde Khan. HX OTHER SURGICAL      Fracture  right  [place]      Current Outpatient Medications   Medication Sig Dispense Refill    rosuvastatin (CRESTOR) 10 mg tablet TAKE 1 TABLET BY MOUTH DAILY 90 Tablet 1    LORazepam (ATIVAN) 0.5 mg tablet Take 1 Tablet by mouth daily as needed for Anxiety. 30 Tablet 0    metoprolol succinate (TOPROL-XL) 25 mg XL tablet TAKE 1 TABLET BY MOUTH EVERY DAY 90 Tab 0    calcium-cholecalciferol, D3, (CALTRATE 600+D) tablet Take 1 Tab by mouth daily. ascorbic acid, vitamin C, (VITAMIN C) 500 mg tablet Take 500 mg by mouth daily. cranberry 500 mg capsule Take 500 mg by mouth daily. acetaminophen (TYLENOL) 325 mg tablet Take  by mouth every four (4) hours as needed for Pain. diclofenac (VOLTAREN) 1 % gel Apply  to affected area four (4) times daily.  100 g 2    flecainide (TAMBOCOR) 50 mg tablet TK 1 T PO Q 12 H  3     Allergies   Allergen Reactions    Flu Vac 2015 (65 Up)-Mf59c(Pf) Rash    Sulfa (Sulfonamide Antibiotics) Rash       Family History   Family history unknown: Yes     Social History     Tobacco Use    Smoking status: Never    Smokeless tobacco: Never   Substance Use Topics    Alcohol use: No         Page Christensen NP

## 2022-12-12 NOTE — PATIENT INSTRUCTIONS
Medicare Wellness Visit, Female     The best way to live healthy is to have a lifestyle where you eat a well-balanced diet, exercise regularly, limit alcohol use, and quit all forms of tobacco/nicotine, if applicable. Regular preventive services are another way to keep healthy. Preventive services (vaccines, screening tests, monitoring & exams) can help personalize your care plan, which helps you manage your own care. Screening tests can find health problems at the earliest stages, when they are easiest to treat. [facility] follows the current, evidence-based guidelines published by the [facility] Arn[facility] Alexandra ([facility]) when recommending preventive services for our patients. Because we follow these guidelines, sometimes recommendations change over time as research supports it. (For example, mammograms used to be recommended annually. Even though Medicare will still pay for an annual mammogram, the newer guidelines recommend a mammogram every two years for women of average risk). Of course, you and your doctor may decide to screen more often for some diseases, based on your risk and your co-morbidities (chronic disease you are already diagnosed with). Preventive services for you include:  - Medicare offers their members a free annual wellness visit, which is time for you and your primary care provider to discuss and plan for your preventive service needs.  Take advantage of this benefit every year!    -Over the age of 72 should receive the recommended pneumonia vaccines.    -All adults should have a flu vaccine yearly.  -All adults should have a tetanus vaccine every 10 years.   -Over the age 48 should receive the shingles vaccines.        -All adults should be screened once for Hepatitis C.  -All adults age 38-68 who are overweight should have a diabetes screening test once every three years.   -Other screening tests and preventive services for persons with diabetes include: an eye exam to screen for diabetic retinopathy, a kidney function test, a foot exam, and stricter control over your cholesterol.   -Cardiovascular screening for adults with routine risk involves an electrocardiogram (ECG) at intervals determined by your doctor.     -Colorectal cancer screenings should be done for adults age 39-70 with no increased risk factors for colorectal cancer. There are a number of acceptable methods of screening for this type of cancer. Each test has its own benefits and drawbacks. Discuss with your doctor what is most appropriate for you during your annual wellness visit. The different tests include: colonoscopy (considered the best screening method), a fecal occult blood test, a fecal DNA test, and sigmoidoscopy.    -Lung cancer screening is recommended annually with a low dose CT scan for adults between age 54 and 68, who have smoked at least 30 pack years (equivalent of 1 pack per day for 30 days), and who is a current smoker or quit less than 15 years ago.    -A bone mass density test is recommended when a woman turns 65 to screen for osteoporosis. This test is only recommended one time, as a screening. Some providers will use this same test as a disease monitoring tool if you already have osteoporosis. -Breast cancer screenings are recommended every other year for women of normal risk, age 54-69.    -Cervical cancer screenings for women over age 72 are only recommended with certain risk factors.      Here is a list of your current Health Maintenance items (your personalized list of preventive services) with a due date:  Health Maintenance Due   Topic Date Due    DTaP/Tdap/Td  (1 - Tdap) Never done    Shingles Vaccine (1 of 2) Never done    Pneumococcal Vaccine (2 - PPSV23 if available, else PCV20) 07/26/2017    COVID-19 Vaccine (3 - Booster for Pfizer series) 04/24/2021    Cholesterol Test   07/14/2022 Alert-The patient is alert, awake and responds to voice. The patient is oriented to time, place, and person. The triage nurse is able to obtain subjective information.

## 2023-01-26 ENCOUNTER — OFFICE VISIT (OUTPATIENT)
Dept: FAMILY MEDICINE CLINIC | Age: 83
End: 2023-01-26
Payer: MEDICARE

## 2023-01-26 ENCOUNTER — DOCUMENTATION ONLY (OUTPATIENT)
Dept: FAMILY MEDICINE CLINIC | Age: 83
End: 2023-01-26

## 2023-01-26 VITALS
DIASTOLIC BLOOD PRESSURE: 76 MMHG | RESPIRATION RATE: 16 BRPM | OXYGEN SATURATION: 96 % | TEMPERATURE: 97.4 F | WEIGHT: 123.4 LBS | SYSTOLIC BLOOD PRESSURE: 122 MMHG | BODY MASS INDEX: 20.56 KG/M2 | HEIGHT: 65 IN | HEART RATE: 77 BPM

## 2023-01-26 DIAGNOSIS — R79.89 ELEVATED TSH: ICD-10-CM

## 2023-01-26 DIAGNOSIS — N63.14 MASS OF LOWER INNER QUADRANT OF RIGHT BREAST: Primary | ICD-10-CM

## 2023-01-26 DIAGNOSIS — E78.5 HYPERLIPIDEMIA, UNSPECIFIED HYPERLIPIDEMIA TYPE: ICD-10-CM

## 2023-01-26 DIAGNOSIS — E03.8 SUBCLINICAL HYPOTHYROIDISM: ICD-10-CM

## 2023-01-26 NOTE — PROGRESS NOTES
diagnostic mammogram and ultrasound has been scheduled for JW 2/13/23 @ 10:00 order fax [phone]

## 2023-01-26 NOTE — PROGRESS NOTES
Identified pt with two pt identifiers(name and ). Reviewed record in preparation for visit and have obtained necessary documentation. Chief Complaint   Patient presents with    Other     For x1 weeks; possible little bumps on rt breast         Health Maintenance Due   Topic    DTaP/Tdap/Td series (1 - Tdap)    Shingles Vaccine (1 of 2)    Pneumococcal 65+ years (2 - PPSV23 if available, else PCV20)    COVID-19 Vaccine (3 - Booster for [employer] series)    Lipid Screen        Coordination of Care Questionnaire:  :   1) Have you been to an emergency room, urgent care, or hospitalized since your last visit? If yes, where when, and reason for visit? no       2. Have seen or consulted any other health care provider since your last visit? If yes, where when, and reason for visit? NO        Patient is accompanied by self I have received verbal consent from Gunner Marquis to discuss any/all medical information while they are present in the room.

## 2023-01-26 NOTE — PROGRESS NOTES
Assessment/Plan:     Diagnoses and all orders for this visit:    1. Mass of lower inner quadrant of right breast  -     [facility] DX INCL CAD; Future  - Very small mobile lump palpated, DX mammogram ordered. Follow up based on results    2. Elevated TSH  -     T4, FREE  -     TSH 3RD GENERATION  - Presumed stable, labs today     3. Subclinical hypothyroidism  -     T4, FREE    4. Hyperlipidemia, unspecified hyperlipidemia type  -     TSH 3RD GENERATION  -     LIPID PANEL  - Presumed stable, labs today          Discussed expected course/resolution/complications of diagnosis in detail with patient. Medication risks/benefits/costs/interactions/alternatives discussed with patient. Pt was given after visit summary which includes diagnoses, current medications & vitals. Pt expressed understanding with the diagnosis and plan        Subjective:      Malia Earl is a [de-identified] y.o. female who presents for had concerns including Other (For x1 weeks; possible little bumps on rt breast ). Here today for breast lump felt on right side 2 to 3 days ago  Family history of breast cancer in aunt and 2 cousins  States she has never had an abnormal mammogram      Current Outpatient Medications   Medication Sig Dispense Refill    rosuvastatin (CRESTOR) 10 mg tablet TAKE 1 TABLET BY MOUTH DAILY 90 Tablet 1    LORazepam (ATIVAN) 0.5 mg tablet Take 1 Tablet by mouth daily as needed for Anxiety. 30 Tablet 0    metoprolol succinate (TOPROL-XL) 25 mg XL tablet TAKE 1 TABLET BY MOUTH EVERY DAY 90 Tab 0    calcium-cholecalciferol, D3, (CALTRATE 600+D) tablet Take 1 Tab by mouth daily. ascorbic acid, vitamin C, (VITAMIN C) 500 mg tablet Take 500 mg by mouth daily. cranberry 500 mg capsule Take 500 mg by mouth daily. acetaminophen (TYLENOL) 325 mg tablet Take  by mouth every four (4) hours as needed for Pain.       flecainide (TAMBOCOR) 50 mg tablet TK 1 T PO Q 12 H  3    diclofenac (VOLTAREN) 1 % gel Apply  to affected area four (4) times daily. 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 Pigmented skin lesion     Face ( Benign)     Sudeck's atrophy     PHof  Sudec's Atrophy     Urinary tract infection     Recurrent Frequent     Past Surgical History:   Procedure Laterality Date    HX APPENDECTOMY      HX COLONOSCOPY      age 69, repeat 8 years, Dr. Peyton Marie. HX OTHER SURGICAL      Fracture  right  [place]      Family History   Family history unknown: Yes     Social History     Socioeconomic History    Marital status:      Spouse name: Not on file    Number of children: Not on file    Years of education: Not on file    Highest education level: Not on file   Occupational History    Not on file   Tobacco Use    Smoking status: Never    Smokeless tobacco: Never   Vaping Use    Vaping Use: Never used   Substance and Sexual Activity    Alcohol use: No    Drug use: No    Sexual activity: Yes   Other Topics Concern    Not on file   Social History Narrative    Not on file     Social Determinants of Health     Financial Resource Strain: Not on file   Food Insecurity: Not on file   Transportation Needs: Not on file   Physical Activity: Not on file   Stress: Not on file   Social Connections: Not on file   Intimate Partner Violence: Not on file   Housing Stability: Not on file       HPI      ROS:   Review of Systems   Constitutional:  Negative for chills, fever and malaise/fatigue.    Eyes: Negative for blurred vision. Respiratory:  Negative for cough and shortness of breath. Cardiovascular:  Negative for chest pain, palpitations and leg swelling. Skin:         Breast lump   Neurological:  Negative for dizziness and headaches. Objective:   Visit Vitals  /76 (BP 1 Location: Left upper arm, BP Patient Position: Sitting, BP Cuff Size: Adult)   Pulse 77   Temp 97.4 °F (36.3 °C) (Temporal)   Resp 16   Ht 5' 5\" (1.651 m)   Wt 123 lb 6.4 oz (56 kg)   SpO2 96%   BMI 20.53 kg/m²         Vitals and Nurse Documentation reviewed. Physical Exam  Exam conducted with a chaperone present.    Chest:           Results for orders placed or performed in visit on 01/26/23   T4, FREE   Result Value Ref Range    T4, Free 0.9 0.8 - 1.5 NG/DL   TSH 3RD GENERATION   Result Value Ref Range    TSH 2.88 0.36 - 3.74 uIU/mL   LIPID PANEL   Result Value Ref Range    Cholesterol, total 159 <200 MG/DL    Triglyceride 83 <150 MG/DL    HDL Cholesterol 84 MG/DL    LDL, calculated 58.4 0 - 100 MG/DL    VLDL, calculated 16.6 MG/DL    CHOL/HDL Ratio 1.9 0.0 - 5.0

## 2023-01-27 LAB
CHOLEST SERPL-MCNC: 159 MG/DL
HDLC SERPL-MCNC: 84 MG/DL
HDLC SERPL: 1.9 (ref 0–5)
LDLC SERPL CALC-MCNC: 58.4 MG/DL (ref 0–100)
T4 FREE SERPL-MCNC: 0.9 NG/DL (ref 0.8–1.5)
TRIGL SERPL-MCNC: 83 MG/DL (ref ?–150)
TSH SERPL DL<=0.05 MIU/L-ACNC: 2.88 UIU/ML (ref 0.36–3.74)
VLDLC SERPL CALC-MCNC: 16.6 MG/DL

## 2023-02-22 DIAGNOSIS — E78.5 HYPERLIPIDEMIA, UNSPECIFIED HYPERLIPIDEMIA TYPE: ICD-10-CM

## 2023-02-22 RX ORDER — ROSUVASTATIN CALCIUM 10 MG/1
10 TABLET, COATED ORAL DAILY
Qty: 90 TABLET | Refills: 1 | Status: SHIPPED | OUTPATIENT
Start: 2023-02-22

## 2023-02-22 NOTE — TELEPHONE ENCOUNTER
PCP: Ella Vinson MD    Last appt: 1/26/2023  No future appointments.     Requested Prescriptions      No prescriptions requested or ordered in this encounter       Prior labs and Blood pressures:  BP Readings from Last 3 Encounters:   01/26/23 122/76   12/12/22 133/83   03/21/22 119/75     Lab Results   Component Value Date/Time    Sodium 140 07/14/2021 12:15 PM    Potassium 4.5 07/14/2021 12:15 PM    Chloride 106 07/14/2021 12:15 PM    CO2 29 07/14/2021 12:15 PM    Anion gap 5 07/14/2021 12:15 PM    Glucose 96 07/14/2021 12:15 PM    BUN 15 07/14/2021 12:15 PM    Creatinine 0.70 07/14/2021 12:15 PM    BUN/Creatinine ratio 21 (H) 07/14/2021 12:15 PM    GFR est AA >60 07/14/2021 12:15 PM    GFR est non-AA >60 07/14/2021 12:15 PM    Calcium 9.6 07/14/2021 12:15 PM     No results found for: HBA1C, XIY9TYVH, OYM8QAHN  Lab Results   Component Value Date/Time    Cholesterol, total 159 01/26/2023 01:33 PM    HDL Cholesterol 84 01/26/2023 01:33 PM    LDL, calculated 58.4 01/26/2023 01:33 PM    VLDL, calculated 16.6 01/26/2023 01:33 PM    Triglyceride 83 01/26/2023 01:33 PM    CHOL/HDL Ratio 1.9 01/26/2023 01:33 PM     Lab Results   Component Value Date/Time    Vitamin D 25-Hydroxy 35.1 03/04/2020 09:51 AM       Lab Results   Component Value Date/Time    TSH 2.88 01/26/2023 01:33 PM

## 2023-05-26 RX ORDER — LORAZEPAM 0.5 MG/1
0.5 TABLET ORAL DAILY PRN
COMMUNITY
Start: 2022-03-21

## 2023-05-26 RX ORDER — ACETAMINOPHEN 325 MG/1
TABLET ORAL EVERY 4 HOURS PRN
COMMUNITY

## 2023-05-26 RX ORDER — CALCIUM CARBONATE/VITAMIN D3 600 MG-10
1 TABLET ORAL DAILY
COMMUNITY

## 2023-05-26 RX ORDER — METOPROLOL SUCCINATE 25 MG/1
0.5 TABLET, EXTENDED RELEASE ORAL DAILY
COMMUNITY
Start: 2023-03-06

## 2023-05-26 RX ORDER — FLECAINIDE ACETATE 50 MG/1
TABLET ORAL
COMMUNITY
Start: 2018-11-07

## 2023-05-26 RX ORDER — ASCORBIC ACID 500 MG
500 TABLET ORAL DAILY
COMMUNITY

## 2023-05-26 RX ORDER — ROSUVASTATIN CALCIUM 10 MG/1
10 TABLET, COATED ORAL DAILY
COMMUNITY
Start: 2023-02-22

## 2023-10-05 RX ORDER — ROSUVASTATIN CALCIUM 10 MG/1
10 TABLET, COATED ORAL DAILY
Qty: 30 TABLET | Refills: 0 | OUTPATIENT
Start: 2023-10-05

## 2023-10-05 NOTE — TELEPHONE ENCOUNTER
PCP: Olu Bass MD    Last appt: [unfilled]  Patient sent a message to you asking for this refill. I don't think you have ever seen her but you have seen her . No future appointments.     Requested Prescriptions     Pending Prescriptions Disp Refills    rosuvastatin (CRESTOR) 10 MG tablet 30 tablet      Sig: Take 1 tablet by mouth daily       Prior labs and Blood pressures:  BP Readings from Last 3 Encounters:   01/26/23 122/76   12/12/22 133/83   03/21/22 119/75     Lab Results   Component Value Date/Time     07/14/2021 12:15 PM    K 4.5 07/14/2021 12:15 PM     07/14/2021 12:15 PM    CO2 29 07/14/2021 12:15 PM    BUN 15 07/14/2021 12:15 PM    GFRAA >60 07/14/2021 12:15 PM     No results found for: \"HBA1C\", \"[id number]\"  Lab Results   Component Value Date/Time    CHOL 159 01/26/2023 01:33 PM    HDL 84 01/26/2023 01:33 PM     No results found for: \"VITD3\", \"VD3RIA\"    Lab Results   Component Value Date/Time    TSH 2.88 01/26/2023 01:33 PM

## 2023-10-09 NOTE — TELEPHONE ENCOUNTER
PCP: Vanessa Mart MD    Last appt: [unfilled]  Future Appointments   Date Time Provider [address]   11/1/2023  2:40 PM May Jimenez, APRN - CNP [facility]       Requested Prescriptions     Pending Prescriptions Disp Refills    rosuvastatin (CRESTOR) 10 MG tablet 90 tablet 0     Sig: Take 1 tablet by mouth daily    metoprolol succinate (TOPROL XL) 25 MG extended release tablet 50 tablet 0     Sig: Take 1/2 tablet by mouth daily       Prior labs and Blood pressures:  BP Readings from Last 3 Encounters:   01/26/23 122/76   12/12/22 133/83   03/21/22 119/75     Lab Results   Component Value Date/Time     07/14/2021 12:15 PM    K 4.5 07/14/2021 12:15 PM     07/14/2021 12:15 PM    CO2 29 07/14/2021 12:15 PM    BUN 15 07/14/2021 12:15 PM    GFRAA >60 07/14/2021 12:15 PM     No results found for: \"HBA1C\", \"[id number]\"  Lab Results   Component Value Date/Time    CHOL 159 01/26/2023 01:33 PM    HDL 84 01/26/2023 01:33 PM     No results found for: \"VITD3\", \"VD3RIA\"    Lab Results   Component Value Date/Time    TSH 2.88 01/26/2023 01:33 PM

## 2023-10-10 RX ORDER — ROSUVASTATIN CALCIUM 10 MG/1
10 TABLET, COATED ORAL DAILY
Qty: 30 TABLET | Refills: 0 | Status: SHIPPED | OUTPATIENT
Start: 2023-10-10

## 2023-10-10 RX ORDER — METOPROLOL SUCCINATE 25 MG/1
TABLET, EXTENDED RELEASE ORAL
Qty: 15 TABLET | Refills: 0 | Status: SHIPPED | OUTPATIENT
Start: 2023-10-10 | End: 2023-10-10

## 2023-10-10 RX ORDER — METOPROLOL SUCCINATE 25 MG/1
TABLET, EXTENDED RELEASE ORAL
Qty: 45 TABLET | Refills: 0 | Status: SHIPPED | OUTPATIENT
Start: 2023-10-10

## 2023-10-10 RX ORDER — ROSUVASTATIN CALCIUM 10 MG/1
10 TABLET, COATED ORAL DAILY
Qty: 90 TABLET | Refills: 0 | OUTPATIENT
Start: 2023-10-10

## 2023-10-10 NOTE — TELEPHONE ENCOUNTER
PCP: Natalie Heredia MD    Last appt: [unfilled]  This was approved last week but pharmacy was not attached. Can you approve again please!   Future Appointments   Date Time Provider [address]   11/1/2023  2:40 PM Felicia Jimenez, APRN - CNP [facility]       Requested Prescriptions     Pending Prescriptions Disp Refills    rosuvastatin (CRESTOR) 10 MG tablet 30 tablet 0     Sig: Take 1 tablet by mouth daily       Prior labs and Blood pressures:  BP Readings from Last 3 Encounters:   01/26/23 122/76   12/12/22 133/83   03/21/22 119/75     Lab Results   Component Value Date/Time     07/14/2021 12:15 PM    K 4.5 07/14/2021 12:15 PM     07/14/2021 12:15 PM    CO2 29 07/14/2021 12:15 PM    BUN 15 07/14/2021 12:15 PM    GFRAA >60 07/14/2021 12:15 PM     No results found for: \"HBA1C\", \"[id number]\"  Lab Results   Component Value Date/Time    CHOL 159 01/26/2023 01:33 PM    HDL 84 01/26/2023 01:33 PM     No results found for: \"VITD3\", \"VD3RIA\"    Lab Results   Component Value Date/Time    TSH 2.88 01/26/2023 01:33 PM

## 2023-10-10 NOTE — TELEPHONE ENCOUNTER
PCP: Rozina Mckeon MD    Last appt: [unfilled]  Future Appointments   Date Time Provider [address]   11/1/2023  2:40 PM Ena Jimenez APRN - CNP [facility]FL[facility]       Requested Prescriptions     Pending Prescriptions Disp Refills    metoprolol succinate (TOPROL XL) 25 MG extended release tablet [Pharmacy Med Name: METOPROLOL ER SUCCINATE 25MG TABS] 45 tablet      Sig: TAKE 1/2 TABLET BY MOUTH DAILY       Prior labs and Blood pressures:  BP Readings from Last 3 Encounters:   01/26/23 122/76   12/12/22 133/83   03/21/22 119/75     Lab Results   Component Value Date/Time     07/14/2021 12:15 PM    K 4.5 07/14/2021 12:15 PM     07/14/2021 12:15 PM    CO2 29 07/14/2021 12:15 PM    BUN 15 07/14/2021 12:15 PM    GFRAA >60 07/14/2021 12:15 PM     No results found for: \"HBA1C\", \"[id number]\"  Lab Results   Component Value Date/Time    CHOL 159 01/26/2023 01:33 PM    HDL 84 01/26/2023 01:33 PM     No results found for: \"VITD3\", \"VD3RIA\"    Lab Results   Component Value Date/Time    TSH 2.88 01/26/2023 01:33 PM

## 2023-10-30 PROBLEM — D35.2 BENIGN NEOPLASM OF PITUITARY GLAND (HCC): Status: ACTIVE | Noted: 2023-10-30

## 2023-10-30 NOTE — PROGRESS NOTES
Manpreet Villalobos, was evaluated through a synchronous (real-time) audio-video encounter. The patient (or guardian if applicable) is aware that this is a billable service, which includes applicable co-pays. This Virtual Visit was conducted with patient's (and/or legal guardian's) consent. Patient identification was verified, and a caregiver was present when appropriate. The patient was located at Home: [address] [place] [address] [address] [place] [address]  Provider was located at [facility] (Appt Dept): [address] [facility] [place]  1 Chencho Foley (:  1940) is a Established patient, presenting virtually for evaluation of the following:    Assessment & Plan   Below is the assessment and plan developed based on review of pertinent history, physical exam, labs, studies, and medications. 1. Other specified hypothyroidism  - Denies concerns, due for labs in January, feeling well overall  2. Hyperlipidemia, unspecified hyperlipidemia type  - Encouraged heart healthy diet and physical activity recs. 3. Benign neoplasm of pituitary gland [facility])  - Following with Dr. Al Woods    No follow-ups on file. Elaine Chapman is a 82 y.o. female who presents for virtual visit for follow up on blood pressure. She reports low readings. She takes 1/2 metoprolol tablet. She reports typically 846T systolic. She follows with cardiology. She sees Dr. Al Woods for management of pituitary. She denies any symptoms of headaches. She gets periodic MRIs. Review of Systems   Constitutional:  Negative for activity change, fatigue and fever. HENT:  Negative for congestion and sinus pain. Eyes:  Negative for visual disturbance. Respiratory:  Negative for cough, chest tightness and shortness of breath. Cardiovascular:  Negative for chest pain and palpitations. Gastrointestinal:  Negative for abdominal pain, blood in stool, constipation, diarrhea and nausea.

## 2023-11-01 ENCOUNTER — TELEMEDICINE (OUTPATIENT)
Facility: CLINIC | Age: 83
End: 2023-11-01

## 2023-11-01 DIAGNOSIS — E03.8 OTHER SPECIFIED HYPOTHYROIDISM: Primary | ICD-10-CM

## 2023-11-01 DIAGNOSIS — E78.5 HYPERLIPIDEMIA, UNSPECIFIED HYPERLIPIDEMIA TYPE: ICD-10-CM

## 2023-11-01 DIAGNOSIS — D35.2 BENIGN NEOPLASM OF PITUITARY GLAND (HCC): ICD-10-CM

## 2023-11-01 PROCEDURE — 1123F ACP DISCUSS/DSCN MKR DOCD: CPT | Performed by: FAMILY MEDICINE

## 2023-11-01 PROCEDURE — 99213 OFFICE O/P EST LOW 20 MIN: CPT | Performed by: FAMILY MEDICINE

## 2023-11-01 ASSESSMENT — ENCOUNTER SYMPTOMS
SINUS PAIN: 0
DIARRHEA: 0
CONSTIPATION: 0
SHORTNESS OF BREATH: 0
ABDOMINAL PAIN: 0
CHEST TIGHTNESS: 0
COUGH: 0
NAUSEA: 0
BLOOD IN STOOL: 0

## 2023-11-07 RX ORDER — METOPROLOL SUCCINATE 25 MG/1
TABLET, EXTENDED RELEASE ORAL
Qty: 45 TABLET | Refills: 1 | Status: SHIPPED | OUTPATIENT
Start: 2023-11-07

## 2023-11-08 RX ORDER — ROSUVASTATIN CALCIUM 10 MG/1
10 TABLET, COATED ORAL DAILY
Qty: 90 TABLET | Refills: 1 | Status: SHIPPED | OUTPATIENT
Start: 2023-11-08

## 2023-11-08 NOTE — TELEPHONE ENCOUNTER
PCP: Brisa Horton MD    Last appt: [unfilled]  No future appointments.     Requested Prescriptions     Pending Prescriptions Disp Refills    rosuvastatin (CRESTOR) 10 MG tablet 30 tablet 0     Sig: Take 1 tablet by mouth daily       Prior labs and Blood pressures:  BP Readings from Last 3 Encounters:   01/26/23 122/76   12/12/22 133/83   03/21/22 119/75     Lab Results   Component Value Date/Time     07/14/2021 12:15 PM    K 4.5 07/14/2021 12:15 PM     07/14/2021 12:15 PM    CO2 29 07/14/2021 12:15 PM    BUN 15 07/14/2021 12:15 PM    GFRAA >60 07/14/2021 12:15 PM     No results found for: \"HBA1C\", \"[id number]\"  Lab Results   Component Value Date/Time    CHOL 159 01/26/2023 01:33 PM    HDL 84 01/26/2023 01:33 PM     No results found for: \"VITD3\", \"VD3RIA\"    Lab Results   Component Value Date/Time    TSH 2.88 01/26/2023 01:33 PM

## 2024-05-13 RX ORDER — ROSUVASTATIN CALCIUM 10 MG/1
10 TABLET, COATED ORAL DAILY
Qty: 90 TABLET | Refills: 1 | Status: SHIPPED | OUTPATIENT
Start: 2024-05-13

## 2024-05-13 NOTE — TELEPHONE ENCOUNTER
PCP: Mychal Irby MD    Last appt: [unfilled]  No future appointments.    Requested Prescriptions     Pending Prescriptions Disp Refills    rosuvastatin (CRESTOR) 10 MG tablet [Pharmacy Med Name: ROSUVASTATIN 10MG TABLETS] 90 tablet 1     Sig: TAKE 1 TABLET BY MOUTH DAILY       Prior labs and Blood pressures:  BP Readings from Last 3 Encounters:   01/26/23 122/76   12/12/22 133/83   03/21/22 119/75     Lab Results   Component Value Date/Time     07/14/2021 12:15 PM    K 4.5 07/14/2021 12:15 PM     07/14/2021 12:15 PM    CO2 29 07/14/2021 12:15 PM    BUN 15 07/14/2021 12:15 PM    GFRAA >60 07/14/2021 12:15 PM     No results found for: \"HBA1C\", \"[id number]\"  Lab Results   Component Value Date/Time    CHOL 159 01/26/2023 01:33 PM    HDL 84 01/26/2023 01:33 PM    LDL 58.4 01/26/2023 01:33 PM    VLDL 16.6 01/26/2023 01:33 PM     No results found for: \"VITD3\", \"VD3RIA\"    Lab Results   Component Value Date/Time    TSH 2.88 01/26/2023 01:33 PM

## 2024-07-22 RX ORDER — METOPROLOL SUCCINATE 25 MG/1
12.5 TABLET, EXTENDED RELEASE ORAL DAILY
Qty: 45 TABLET | Refills: 1 | Status: SHIPPED | OUTPATIENT
Start: 2024-07-22

## 2024-10-15 ENCOUNTER — OFFICE VISIT (OUTPATIENT)
Facility: CLINIC | Age: 84
End: 2024-10-15
Payer: MEDICARE

## 2024-10-15 VITALS
WEIGHT: 116 LBS | BODY MASS INDEX: 19.33 KG/M2 | HEART RATE: 91 BPM | SYSTOLIC BLOOD PRESSURE: 126 MMHG | OXYGEN SATURATION: 98 % | DIASTOLIC BLOOD PRESSURE: 83 MMHG | HEIGHT: 65 IN | RESPIRATION RATE: 16 BRPM | TEMPERATURE: 98 F

## 2024-10-15 DIAGNOSIS — D35.2 BENIGN NEOPLASM OF PITUITARY GLAND (HCC): ICD-10-CM

## 2024-10-15 DIAGNOSIS — M54.50 ACUTE MIDLINE LOW BACK PAIN WITHOUT SCIATICA: Primary | ICD-10-CM

## 2024-10-15 DIAGNOSIS — Z91.81 AT HIGH RISK FOR FALLS: ICD-10-CM

## 2024-10-15 PROCEDURE — G8484 FLU IMMUNIZE NO ADMIN: HCPCS | Performed by: NURSE PRACTITIONER

## 2024-10-15 PROCEDURE — 1123F ACP DISCUSS/DSCN MKR DOCD: CPT | Performed by: NURSE PRACTITIONER

## 2024-10-15 PROCEDURE — 1036F TOBACCO NON-USER: CPT | Performed by: NURSE PRACTITIONER

## 2024-10-15 PROCEDURE — 99214 OFFICE O/P EST MOD 30 MIN: CPT | Performed by: NURSE PRACTITIONER

## 2024-10-15 PROCEDURE — 1090F PRES/ABSN URINE INCON ASSESS: CPT | Performed by: NURSE PRACTITIONER

## 2024-10-15 PROCEDURE — G8427 DOCREV CUR MEDS BY ELIG CLIN: HCPCS | Performed by: NURSE PRACTITIONER

## 2024-10-15 PROCEDURE — G8400 PT W/DXA NO RESULTS DOC: HCPCS | Performed by: NURSE PRACTITIONER

## 2024-10-15 PROCEDURE — G8420 CALC BMI NORM PARAMETERS: HCPCS | Performed by: NURSE PRACTITIONER

## 2024-10-15 RX ORDER — GABAPENTIN 300 MG/1
300 CAPSULE ORAL NIGHTLY
Qty: 30 CAPSULE | Refills: 0 | Status: SHIPPED | OUTPATIENT
Start: 2024-10-15 | End: 2024-11-14

## 2024-10-15 ASSESSMENT — PATIENT HEALTH QUESTIONNAIRE - PHQ9
SUM OF ALL RESPONSES TO PHQ9 QUESTIONS 1 & 2: 0
SUM OF ALL RESPONSES TO PHQ QUESTIONS 1-9: 0
1. LITTLE INTEREST OR PLEASURE IN DOING THINGS: NOT AT ALL
SUM OF ALL RESPONSES TO PHQ QUESTIONS 1-9: 0
2. FEELING DOWN, DEPRESSED OR HOPELESS: NOT AT ALL

## 2024-10-15 ASSESSMENT — ENCOUNTER SYMPTOMS: BACK PAIN: 1

## 2024-10-15 NOTE — PROGRESS NOTES
1. Have you been to the ER, urgent care clinic since your last visit?  Hospitalized since your last visit?Yes When: 9/2024 Where: [facility] ([facility]) DC 10/11/2024 Reason for visit: Fall injury    2. Have you seen or consulted any other health care providers outside of the [facility] since your last visit?  Include any pap smears or colon screening. No    Chief Complaint   Patient presents with    Follow-Up from Hospital            Health Maintenance Due   Topic Date Due    Depression Screen  Never done    Shingles vaccine (1 of 2) Never done    Respiratory Syncytial Virus (RSV) Pregnant or age 60 yrs+ (1 - 1-dose 60+ series) Never done    Pneumococcal 65+ years Vaccine (2 of 2 - PPSV23 or PCV20) 07/26/2017    Annual Wellness Visit (Medicare Advantage)  Never done    Lipids  01/26/2024    Flu vaccine (1) 08/01/2024    COVID-19 Vaccine (3 - 2023-24 season) 09/01/2024     PHQ-9 Total Score: 0 (10/15/2024  1:37 PM)        10/15/2024     1:00 PM   AMB Abuse Screening   Do you ever feel afraid of your partner? N   Are you in a relationship with someone who physically or mentally threatens you? N   Is it safe for you to go home? Y         10/15/2024     1:37 PM   Amb Fall Risk Assessment and TUG Test   Do you feel unsteady or are you worried about falling?  yes   2 or more falls in past year? yes   Fall with injury in past year? yes       Vitals:    10/15/24 1330   BP: 126/83   Pulse: 91   Resp: 16   Temp: 98 °F (36.7 °C)   SpO2: 98%       
capsule Take 1 capsule by mouth nightly for 30 days. Intended supply: 30 days Max Daily Amount: 300 mg 10/15/24 11/14/24 Yes Jihan Gonzalez APRN - CNP   acetaminophen (TYLENOL) 325 MG tablet Take by mouth every 4 hours as needed   Yes Automatic Reconciliation, Ar   ascorbic acid (VITAMIN C) 500 MG tablet Take 1 tablet by mouth daily   Yes Automatic Reconciliation, Ar   calcium carb-cholecalciferol 600-10 MG-MCG TABS per tab Take 1 tablet by mouth daily   Yes Automatic Reconciliation, Ar   flecainide (TAMBOCOR) 50 MG tablet TK 1 T PO Q 12 H 11/7/18  Yes Automatic Reconciliation, Ar   metoprolol succinate (TOPROL XL) 25 MG extended release tablet Take 0.5 tablets by mouth daily  Patient not taking: Reported on 10/15/2024 7/22/24   Mychal Irby MD   rosuvastatin (CRESTOR) 10 MG tablet TAKE 1 TABLET BY MOUTH DAILY  Patient not taking: Reported on 10/15/2024 5/13/24   Mychal Irby MD        Allergies   Allergen Reactions    Sulfa Antibiotics Rash       Vitals:    10/15/24 1330   BP: 126/83   Site: Left Upper Arm   Position: Sitting   Cuff Size: Small Adult   Pulse: 91   Resp: 16   Temp: 98 °F (36.7 °C)   TempSrc: Skin   SpO2: 98%   Weight: 52.6 kg (116 lb)   Height: 1.651 m (5' 5\")     Body mass index is 19.3 kg/m².      Objective  Physical Exam  Vitals and nursing note reviewed.   Constitutional:       General: She is not in acute distress.     Appearance: Normal appearance.   Cardiovascular:      Rate and Rhythm: Normal rate and regular rhythm.   Pulmonary:      Effort: Pulmonary effort is normal.      Breath sounds: Normal breath sounds.   Musculoskeletal:      Lumbar back: Tenderness (L5/S1 midline and bilateral paramidline low back pain) present. Decreased range of motion.   Neurological:      Mental Status: She is alert.      Gait: Gait abnormal (Antalgic).   Psychiatric:         Mood and Affect: Mood normal.         Behavior: Behavior normal.         Thought Content: Thought content normal.

## 2024-10-16 ASSESSMENT — ENCOUNTER SYMPTOMS
ABDOMINAL PAIN: 0
SHORTNESS OF BREATH: 0

## 2024-10-21 ENCOUNTER — TELEPHONE (OUTPATIENT)
Facility: CLINIC | Age: 84
End: 2024-10-21

## 2024-10-21 NOTE — TELEPHONE ENCOUNTER
----- Message from Gissel BARKLEY sent at 10/18/2024  9:18 AM EDT -----  Regarding: [facility] Appointment Request  [facility] Appointment Request    Patient needs appointment for [facility] Appointment Type: Existing Condition Follow Up.    Patient Requested Dates(s): next week /  Tuesday or Thursday   Patient Requested Time:  anytime   Provider Name: Jihan Gonzalez     Reason for Appointment Request: Established Patient - Available appointments did not meet patient need. Patient needs an appointment within next week for long term care paperwork.   --------------------------------------------------------------------------------------------------------------------------    Relationship to Patient: Self     Call Back Information: OK to leave message on voicemail  Preferred Call Back Number: Phone [phone]

## 2024-10-21 NOTE — TELEPHONE ENCOUNTER
Oksana Rodriguez from [facility]. [facility] called asking the status of paperwork to [facility] the [facility].    Her contact phone:  [phone]    emery

## 2024-10-22 ENCOUNTER — OFFICE VISIT (OUTPATIENT)
Facility: CLINIC | Age: 84
End: 2024-10-22

## 2024-10-22 ENCOUNTER — ENROLLMENT (OUTPATIENT)
Dept: PHARMACY | Facility: CLINIC | Age: 84
End: 2024-10-22

## 2024-10-22 VITALS
SYSTOLIC BLOOD PRESSURE: 108 MMHG | HEIGHT: 65 IN | TEMPERATURE: 97 F | RESPIRATION RATE: 16 BRPM | BODY MASS INDEX: 19.49 KG/M2 | DIASTOLIC BLOOD PRESSURE: 68 MMHG | WEIGHT: 117 LBS | HEART RATE: 96 BPM | OXYGEN SATURATION: 97 %

## 2024-10-22 DIAGNOSIS — K59.09 OTHER CONSTIPATION: ICD-10-CM

## 2024-10-22 DIAGNOSIS — R26.89 BALANCE PROBLEM: ICD-10-CM

## 2024-10-22 DIAGNOSIS — Z98.890 H/O KYPHOPLASTY: ICD-10-CM

## 2024-10-22 DIAGNOSIS — R55 NEAR SYNCOPE: Primary | ICD-10-CM

## 2024-10-22 DIAGNOSIS — Z02.89 ENCOUNTER FOR COMPLETION OF FORM WITH PATIENT: ICD-10-CM

## 2024-10-22 DIAGNOSIS — M54.50 MIDLINE LOW BACK PAIN WITHOUT SCIATICA, UNSPECIFIED CHRONICITY: ICD-10-CM

## 2024-10-22 DIAGNOSIS — D35.2 BENIGN NEOPLASM OF PITUITARY GLAND (HCC): ICD-10-CM

## 2024-10-22 NOTE — PROGRESS NOTES
\"Have you been to the ER, urgent care clinic since your last visit?  Hospitalized since your last visit?\"    Yes, [facility]/ [facility]    “Have you seen or consulted any other health care providers outside our system since your last visit?”    NO

## 2024-10-23 PROBLEM — R55 NEAR SYNCOPE: Status: ACTIVE | Noted: 2024-10-23

## 2024-10-23 PROBLEM — M54.50 MIDLINE LOW BACK PAIN WITHOUT SCIATICA: Status: ACTIVE | Noted: 2024-10-23

## 2024-10-23 PROBLEM — Z98.890 H/O KYPHOPLASTY: Status: ACTIVE | Noted: 2024-10-23

## 2024-10-23 PROBLEM — K59.09 OTHER CONSTIPATION: Status: ACTIVE | Noted: 2024-10-23

## 2024-10-23 PROBLEM — R26.89 BALANCE PROBLEM: Status: ACTIVE | Noted: 2024-10-23

## 2024-10-23 ASSESSMENT — ENCOUNTER SYMPTOMS: BACK PAIN: 1

## 2024-10-23 NOTE — ASSESSMENT & PLAN NOTE
Discussed near syncopal episode with patient and daughter, advised to change positions slowly and increase fluids. Follow-up with PCP in [place].

## 2024-10-25 ASSESSMENT — ENCOUNTER SYMPTOMS
CHEST TIGHTNESS: 0
CONSTIPATION: 1
SHORTNESS OF BREATH: 0

## 2024-10-29 ENCOUNTER — TELEPHONE (OUTPATIENT)
Facility: CLINIC | Age: 84
End: 2024-10-29

## 2024-10-29 NOTE — TELEPHONE ENCOUNTER
TC from [facility]    Just needed to know patient last AWV (2022)  and flu injection ( no flu shot this year )        Kirsty Kennedy LPN

## 2024-11-19 ENCOUNTER — TELEPHONE (OUTPATIENT)
Dept: PHARMACY | Facility: CLINIC | Age: 84
End: 2024-11-19

## 2024-11-19 RX ORDER — ROSUVASTATIN CALCIUM 10 MG/1
10 TABLET, COATED ORAL DAILY
Qty: 90 TABLET | Refills: 1 | Status: SHIPPED | OUTPATIENT
Start: 2024-11-19

## 2024-11-19 NOTE — TELEPHONE ENCOUNTER
POPULATION HEALTH CLINICAL PHARMACY REVIEW: RECENT FRACTURE    Yue Abrams is a 84 y.o. old White (non-) female patient who recently had a fracture of T12 9/18/24 (per 9/23 message, possibly s/p fall; kyphoplasty 10/14/24)  - per 10/15/24 OV, will be moving to [place], near daughter  - appears forms have been faxed to assisted living facility in [place]    Lab Results   Component Value Date    VITD25 35.1 03/04/2020      Lab Results   Component Value Date    CALCIUM 9.6 07/14/2021     CrCl cannot be calculated (Patient's most recent lab result is older than the maximum 180 days allowed.).    DEXA 9/9/2016:  Osteoporosis    FRAX-calculated 10-year fracture probability:   Per WHO calculator: major Osteoporotic = 36% and Hip = 21%    Assessment:   - 84 y.o. female with recent fracture and may benefit from DEXA to assess current BMD - last DEXA 2016, osteoporosis  - AACE recommends to initiate pharmacologic treatment in those with hip or vertebral fracture.  - Appears patient has/is moving to [place]    Considerations:  - Suggest obtaining DEXA    Letter to patient    Estelle Tolentino, PharmD, BCACP  Population Health Pharmacist  [facility] Clinical Pharmacy  Department, toll free: [phone], option 1     =======================================================   For Pharmacy Admin Tracking Only    Program: [employer]  CPA in place:  No  Gap Closed?: No   Time Spent (min): 15

## 2024-12-06 ENCOUNTER — TELEPHONE (OUTPATIENT)
Facility: CLINIC | Age: 84
End: 2024-12-06

## 2024-12-09 ENCOUNTER — TELEPHONE (OUTPATIENT)
Facility: CLINIC | Age: 84
End: 2024-12-09

## 2024-12-09 NOTE — TELEPHONE ENCOUNTER
----- Message from Precilla P sent at 12/9/2024  9:11 AM EST -----  Regarding: ECC Message to Provider  ECC Message to Provider    Relationship to Patient: Third Party  iris Lewis    Additional Information third party is asking if the pcp received the paperworks they sent last 11/8/2024 these are the functional screening form of the pt for the long term care.The forms are needed today  --------------------------------------------------------------------------------------------------------------------------    Call Back Information: OK to leave message on voicemail  Preferred Call Back Number: Phone

## 2024-12-11 ENCOUNTER — TELEPHONE (OUTPATIENT)
Facility: CLINIC | Age: 84
End: 2024-12-11

## 2024-12-11 NOTE — TELEPHONE ENCOUNTER
Ema ([phone]) advised that she needs the 2nd page of these forms completed and returned to her today.    She advised that she is on a deadline and has received the paperwork twice, but the 2nd page has not been completed.     Please assist with this matter.     [facility] - PSR (Float Pool)